# Patient Record
Sex: MALE | Race: OTHER | HISPANIC OR LATINO
[De-identification: names, ages, dates, MRNs, and addresses within clinical notes are randomized per-mention and may not be internally consistent; named-entity substitution may affect disease eponyms.]

---

## 2017-01-27 ENCOUNTER — MEDICATION RENEWAL (OUTPATIENT)
Age: 57
End: 2017-01-27

## 2017-02-27 ENCOUNTER — RX RENEWAL (OUTPATIENT)
Age: 57
End: 2017-02-27

## 2017-03-22 ENCOUNTER — RX RENEWAL (OUTPATIENT)
Age: 57
End: 2017-03-22

## 2017-04-22 ENCOUNTER — APPOINTMENT (OUTPATIENT)
Dept: FAMILY MEDICINE | Facility: CLINIC | Age: 57
End: 2017-04-22

## 2017-04-22 VITALS
DIASTOLIC BLOOD PRESSURE: 78 MMHG | SYSTOLIC BLOOD PRESSURE: 136 MMHG | WEIGHT: 198 LBS | BODY MASS INDEX: 28.35 KG/M2 | HEIGHT: 70 IN

## 2017-04-22 VITALS
HEIGHT: 70 IN | SYSTOLIC BLOOD PRESSURE: 134 MMHG | DIASTOLIC BLOOD PRESSURE: 76 MMHG | WEIGHT: 198 LBS | BODY MASS INDEX: 28.35 KG/M2

## 2017-05-12 LAB
ALBUMIN SERPL ELPH-MCNC: 4.5 G/DL
ALP BLD-CCNC: 94 U/L
ALT SERPL-CCNC: 34 U/L
ANION GAP SERPL CALC-SCNC: 18 MMOL/L
AST SERPL-CCNC: 27 U/L
BASOPHILS # BLD AUTO: 0.05 K/UL
BASOPHILS NFR BLD AUTO: 1 %
BILIRUB SERPL-MCNC: 0.2 MG/DL
BUN SERPL-MCNC: 16 MG/DL
CALCIUM SERPL-MCNC: 9.4 MG/DL
CHLORIDE SERPL-SCNC: 103 MMOL/L
CHOLEST SERPL-MCNC: 172 MG/DL
CHOLEST/HDLC SERPL: 4 RATIO
CO2 SERPL-SCNC: 21 MMOL/L
CREAT SERPL-MCNC: 1.29 MG/DL
EOSINOPHIL # BLD AUTO: 0.11 K/UL
EOSINOPHIL NFR BLD AUTO: 2.1 %
GLUCOSE SERPL-MCNC: 98 MG/DL
HCT VFR BLD CALC: 43.8 %
HDLC SERPL-MCNC: 43 MG/DL
HGB BLD-MCNC: 14.2 G/DL
IMM GRANULOCYTES NFR BLD AUTO: 0 %
LDLC SERPL CALC-MCNC: 75 MG/DL
LYMPHOCYTES # BLD AUTO: 2.27 K/UL
LYMPHOCYTES NFR BLD AUTO: 43.6 %
MAN DIFF?: NORMAL
MCHC RBC-ENTMCNC: 29.6 PG
MCHC RBC-ENTMCNC: 32.4 GM/DL
MCV RBC AUTO: 91.4 FL
MONOCYTES # BLD AUTO: 0.43 K/UL
MONOCYTES NFR BLD AUTO: 8.3 %
NEUTROPHILS # BLD AUTO: 2.35 K/UL
NEUTROPHILS NFR BLD AUTO: 45 %
PLATELET # BLD AUTO: 234 K/UL
POTASSIUM SERPL-SCNC: 4.2 MMOL/L
PROT SERPL-MCNC: 7.7 G/DL
PSA FREE FLD-MCNC: 15.7 %
PSA FREE SERPL-MCNC: 0.18 NG/ML
PSA SERPL-MCNC: 1.15 NG/ML
RBC # BLD: 4.79 M/UL
RBC # FLD: 13.3 %
SODIUM SERPL-SCNC: 142 MMOL/L
TRIGL SERPL-MCNC: 272 MG/DL
WBC # FLD AUTO: 5.21 K/UL

## 2017-11-01 ENCOUNTER — RX RENEWAL (OUTPATIENT)
Age: 57
End: 2017-11-01

## 2017-11-01 ENCOUNTER — MEDICATION RENEWAL (OUTPATIENT)
Age: 57
End: 2017-11-01

## 2017-11-13 ENCOUNTER — MEDICATION RENEWAL (OUTPATIENT)
Age: 57
End: 2017-11-13

## 2017-12-27 ENCOUNTER — APPOINTMENT (OUTPATIENT)
Dept: FAMILY MEDICINE | Facility: CLINIC | Age: 57
End: 2017-12-27
Payer: COMMERCIAL

## 2017-12-27 ENCOUNTER — NON-APPOINTMENT (OUTPATIENT)
Age: 57
End: 2017-12-27

## 2017-12-27 VITALS
SYSTOLIC BLOOD PRESSURE: 124 MMHG | OXYGEN SATURATION: 98 % | DIASTOLIC BLOOD PRESSURE: 70 MMHG | BODY MASS INDEX: 28.06 KG/M2 | HEIGHT: 70 IN | WEIGHT: 196 LBS | HEART RATE: 58 BPM | RESPIRATION RATE: 14 BRPM

## 2017-12-27 DIAGNOSIS — Z83.3 FAMILY HISTORY OF DIABETES MELLITUS: ICD-10-CM

## 2017-12-27 DIAGNOSIS — Z87.828 PERSONAL HISTORY OF OTHER (HEALED) PHYSICAL INJURY AND TRAUMA: ICD-10-CM

## 2017-12-27 DIAGNOSIS — Z87.898 PERSONAL HISTORY OF OTHER SPECIFIED CONDITIONS: ICD-10-CM

## 2017-12-27 DIAGNOSIS — Z87.2 PERSONAL HISTORY OF DISEASES OF THE SKIN AND SUBCUTANEOUS TISSUE: ICD-10-CM

## 2017-12-27 LAB
BILIRUB UR QL STRIP: NORMAL
CLARITY UR: CLEAR
GLUCOSE UR-MCNC: NORMAL
HCG UR QL: 0.2 EU/DL
HGB UR QL STRIP.AUTO: NORMAL
KETONES UR-MCNC: NORMAL
LEUKOCYTE ESTERASE UR QL STRIP: NORMAL
NITRITE UR QL STRIP: NORMAL
PH UR STRIP: 5.5
PROT UR STRIP-MCNC: NORMAL
SP GR UR STRIP: 1.02

## 2017-12-27 PROCEDURE — 99213 OFFICE O/P EST LOW 20 MIN: CPT | Mod: 25

## 2017-12-27 PROCEDURE — 99396 PREV VISIT EST AGE 40-64: CPT | Mod: 25

## 2017-12-27 PROCEDURE — 81003 URINALYSIS AUTO W/O SCOPE: CPT | Mod: QW

## 2017-12-27 PROCEDURE — 93000 ELECTROCARDIOGRAM COMPLETE: CPT

## 2018-02-04 ENCOUNTER — FORM ENCOUNTER (OUTPATIENT)
Age: 58
End: 2018-02-04

## 2018-02-05 ENCOUNTER — OUTPATIENT (OUTPATIENT)
Dept: OUTPATIENT SERVICES | Facility: HOSPITAL | Age: 58
LOS: 1 days | End: 2018-02-05
Payer: COMMERCIAL

## 2018-02-05 ENCOUNTER — APPOINTMENT (OUTPATIENT)
Dept: ULTRASOUND IMAGING | Facility: CLINIC | Age: 58
End: 2018-02-05
Payer: COMMERCIAL

## 2018-02-05 DIAGNOSIS — N43.3 HYDROCELE, UNSPECIFIED: ICD-10-CM

## 2018-02-05 PROCEDURE — 93975 VASCULAR STUDY: CPT

## 2018-02-05 PROCEDURE — 93975 VASCULAR STUDY: CPT | Mod: 26

## 2018-02-21 ENCOUNTER — APPOINTMENT (OUTPATIENT)
Dept: CARDIOLOGY | Facility: CLINIC | Age: 58
End: 2018-02-21
Payer: COMMERCIAL

## 2018-02-21 ENCOUNTER — NON-APPOINTMENT (OUTPATIENT)
Age: 58
End: 2018-02-21

## 2018-02-21 VITALS
HEART RATE: 58 BPM | OXYGEN SATURATION: 100 % | WEIGHT: 200 LBS | SYSTOLIC BLOOD PRESSURE: 144 MMHG | BODY MASS INDEX: 28.7 KG/M2 | DIASTOLIC BLOOD PRESSURE: 78 MMHG

## 2018-02-21 DIAGNOSIS — I45.10 UNSPECIFIED RIGHT BUNDLE-BRANCH BLOCK: ICD-10-CM

## 2018-02-21 PROCEDURE — 99204 OFFICE O/P NEW MOD 45 MIN: CPT

## 2018-02-21 PROCEDURE — 93000 ELECTROCARDIOGRAM COMPLETE: CPT

## 2018-02-27 ENCOUNTER — APPOINTMENT (OUTPATIENT)
Dept: CARDIOLOGY | Facility: CLINIC | Age: 58
End: 2018-02-27
Payer: COMMERCIAL

## 2018-02-27 PROCEDURE — 93306 TTE W/DOPPLER COMPLETE: CPT

## 2018-03-12 ENCOUNTER — APPOINTMENT (OUTPATIENT)
Dept: FAMILY MEDICINE | Facility: CLINIC | Age: 58
End: 2018-03-12
Payer: COMMERCIAL

## 2018-03-12 VITALS
BODY MASS INDEX: 28.35 KG/M2 | OXYGEN SATURATION: 99 % | HEIGHT: 70 IN | DIASTOLIC BLOOD PRESSURE: 76 MMHG | WEIGHT: 198 LBS | RESPIRATION RATE: 14 BRPM | SYSTOLIC BLOOD PRESSURE: 136 MMHG | HEART RATE: 68 BPM

## 2018-03-12 DIAGNOSIS — S70.00XA CONTUSION OF UNSPECIFIED HIP, INITIAL ENCOUNTER: ICD-10-CM

## 2018-03-12 DIAGNOSIS — M25.569 PAIN IN UNSPECIFIED KNEE: ICD-10-CM

## 2018-03-12 DIAGNOSIS — S83.249A OTHER TEAR OF MEDIAL MENISCUS, CURRENT INJURY, UNSPECIFIED KNEE, INITIAL ENCOUNTER: ICD-10-CM

## 2018-03-12 PROCEDURE — 99214 OFFICE O/P EST MOD 30 MIN: CPT

## 2018-03-22 ENCOUNTER — MEDICATION RENEWAL (OUTPATIENT)
Age: 58
End: 2018-03-22

## 2018-04-18 ENCOUNTER — APPOINTMENT (OUTPATIENT)
Dept: CARDIOLOGY | Facility: CLINIC | Age: 58
End: 2018-04-18

## 2018-04-30 ENCOUNTER — APPOINTMENT (OUTPATIENT)
Dept: CARDIOLOGY | Facility: CLINIC | Age: 58
End: 2018-04-30

## 2018-06-25 ENCOUNTER — APPOINTMENT (OUTPATIENT)
Dept: CARDIOLOGY | Facility: CLINIC | Age: 58
End: 2018-06-25
Payer: COMMERCIAL

## 2018-06-25 PROCEDURE — 93015 CV STRESS TEST SUPVJ I&R: CPT

## 2018-06-27 ENCOUNTER — OTHER (OUTPATIENT)
Age: 58
End: 2018-06-27

## 2018-10-05 ENCOUNTER — APPOINTMENT (OUTPATIENT)
Dept: FAMILY MEDICINE | Facility: CLINIC | Age: 58
End: 2018-10-05

## 2018-10-16 ENCOUNTER — RX RENEWAL (OUTPATIENT)
Age: 58
End: 2018-10-16

## 2018-11-24 ENCOUNTER — APPOINTMENT (OUTPATIENT)
Dept: FAMILY MEDICINE | Facility: CLINIC | Age: 58
End: 2018-11-24
Payer: COMMERCIAL

## 2018-11-24 VITALS
BODY MASS INDEX: 28.2 KG/M2 | WEIGHT: 197 LBS | OXYGEN SATURATION: 98 % | RESPIRATION RATE: 14 BRPM | HEIGHT: 70 IN | HEART RATE: 68 BPM | DIASTOLIC BLOOD PRESSURE: 80 MMHG | SYSTOLIC BLOOD PRESSURE: 130 MMHG

## 2018-11-24 PROCEDURE — 36415 COLL VENOUS BLD VENIPUNCTURE: CPT

## 2018-11-24 PROCEDURE — 99214 OFFICE O/P EST MOD 30 MIN: CPT | Mod: 25

## 2018-11-24 NOTE — HEALTH RISK ASSESSMENT
[No falls in past year] : Patient reported no falls in the past year [0] : 2) Feeling down, depressed, or hopeless: Not at all (0) [] : No [de-identified] : card [EYD1Hcolc] : 0

## 2018-11-24 NOTE — HISTORY OF PRESENT ILLNESS
[FreeTextEntry1] : Pt is here for BP check , RX refills.  Pt also c/o joint pain, bilat knees, ankles , hands.  Pt states is worse in AM when waking up, and gets better throughout the day.  Pt says all pain is always bi-lat, pt takes an NSAID at times which helps.   [de-identified] : pt notes no cp, no sob, saw cardio, has some aches in hands  , feels stiff, pt notes needs refills and needs repeat blood as none since 2017

## 2018-11-24 NOTE — DATA REVIEWED
[FreeTextEntry1] : echo 2/18 nl la, nl ef\par stress good et , 14 mets, bop increased w exertiopn\par us 2/18 - hydrocoel

## 2018-11-24 NOTE — REVIEW OF SYSTEMS
[Fever] : no fever [Earache] : no earache [Chest Pain] : no chest pain [Shortness Of Breath] : no shortness of breath [Joint Pain] : joint pain [FreeTextEntry9] : some pain in prox thumb , us in am

## 2018-12-24 LAB
ALBUMIN SERPL ELPH-MCNC: 4.3 G/DL
ALP BLD-CCNC: 88 U/L
ALT SERPL-CCNC: 38 U/L
ANION GAP SERPL CALC-SCNC: 14 MMOL/L
AST SERPL-CCNC: 28 U/L
BASOPHILS # BLD AUTO: 0.05 K/UL
BASOPHILS NFR BLD AUTO: 1.2 %
BILIRUB SERPL-MCNC: 0.3 MG/DL
BUN SERPL-MCNC: 16 MG/DL
CALCIUM SERPL-MCNC: 9.3 MG/DL
CHLORIDE SERPL-SCNC: 108 MMOL/L
CHOLEST SERPL-MCNC: 178 MG/DL
CHOLEST/HDLC SERPL: 4.2 RATIO
CO2 SERPL-SCNC: 22 MMOL/L
CREAT SERPL-MCNC: 1.31 MG/DL
EOSINOPHIL # BLD AUTO: 0.1 K/UL
EOSINOPHIL NFR BLD AUTO: 2.4 %
ERYTHROCYTE [SEDIMENTATION RATE] IN BLOOD BY WESTERGREN METHOD: 20 MM/HR
GLUCOSE SERPL-MCNC: 92 MG/DL
HCT VFR BLD CALC: 47.2 %
HDLC SERPL-MCNC: 42 MG/DL
HGB BLD-MCNC: 15.5 G/DL
IMM GRANULOCYTES NFR BLD AUTO: 0 %
LDLC SERPL CALC-MCNC: 110 MG/DL
LYMPHOCYTES # BLD AUTO: 1.67 K/UL
LYMPHOCYTES NFR BLD AUTO: 40.7 %
MAN DIFF?: NORMAL
MCHC RBC-ENTMCNC: 30.6 PG
MCHC RBC-ENTMCNC: 32.8 GM/DL
MCV RBC AUTO: 93.3 FL
MONOCYTES # BLD AUTO: 0.37 K/UL
MONOCYTES NFR BLD AUTO: 9 %
NEUTROPHILS # BLD AUTO: 1.91 K/UL
NEUTROPHILS NFR BLD AUTO: 46.7 %
PLATELET # BLD AUTO: 230 K/UL
POTASSIUM SERPL-SCNC: 4.8 MMOL/L
PROT SERPL-MCNC: 7.5 G/DL
PSA FREE FLD-MCNC: 23.1
PSA FREE SERPL-MCNC: 0.18 NG/ML
PSA SERPL-MCNC: 0.78 NG/ML
RBC # BLD: 5.06 M/UL
RBC # FLD: 13.4 %
RHEUMATOID FACT SER QL: <10 IU/ML
SODIUM SERPL-SCNC: 144 MMOL/L
TRIGL SERPL-MCNC: 128 MG/DL
TSH SERPL-ACNC: 2.19 UIU/ML
URATE SERPL-MCNC: 8.9 MG/DL
WBC # FLD AUTO: 4.1 K/UL

## 2019-04-24 ENCOUNTER — APPOINTMENT (OUTPATIENT)
Dept: FAMILY MEDICINE | Facility: CLINIC | Age: 59
End: 2019-04-24

## 2019-04-24 DIAGNOSIS — M79.646 PAIN IN UNSPECIFIED FINGER(S): ICD-10-CM

## 2019-07-10 ENCOUNTER — NON-APPOINTMENT (OUTPATIENT)
Age: 59
End: 2019-07-10

## 2019-07-10 ENCOUNTER — APPOINTMENT (OUTPATIENT)
Dept: FAMILY MEDICINE | Facility: CLINIC | Age: 59
End: 2019-07-10
Payer: COMMERCIAL

## 2019-07-10 VITALS
HEART RATE: 54 BPM | OXYGEN SATURATION: 98 % | BODY MASS INDEX: 28.49 KG/M2 | RESPIRATION RATE: 14 BRPM | HEIGHT: 70 IN | WEIGHT: 199 LBS | DIASTOLIC BLOOD PRESSURE: 72 MMHG | SYSTOLIC BLOOD PRESSURE: 130 MMHG

## 2019-07-10 DIAGNOSIS — N43.3 HYDROCELE, UNSPECIFIED: ICD-10-CM

## 2019-07-10 LAB
BILIRUB UR QL STRIP: NORMAL
CLARITY UR: CLEAR
GLUCOSE UR-MCNC: NORMAL
HCG UR QL: 0.2 EU/DL
HGB UR QL STRIP.AUTO: NORMAL
KETONES UR-MCNC: NORMAL
LEUKOCYTE ESTERASE UR QL STRIP: NORMAL
NITRITE UR QL STRIP: NORMAL
PH UR STRIP: 6
PROT UR STRIP-MCNC: NORMAL
SP GR UR STRIP: 1.02

## 2019-07-10 PROCEDURE — 93000 ELECTROCARDIOGRAM COMPLETE: CPT

## 2019-07-10 PROCEDURE — 81003 URINALYSIS AUTO W/O SCOPE: CPT | Mod: QW

## 2019-07-10 PROCEDURE — 99396 PREV VISIT EST AGE 40-64: CPT | Mod: 25

## 2019-07-10 NOTE — REVIEW OF SYSTEMS
[Abdominal Pain] : abdominal pain [Heartburn] : heartburn [Chest Pain] : no chest pain [Shortness Of Breath] : no shortness of breath [FreeTextEntry7] : pt takes antacid, takes daily

## 2019-07-10 NOTE — HEALTH RISK ASSESSMENT
[No] : No [No falls in past year] : Patient reported no falls in the past year [With Family] : lives with family [None] : None [Employed] : employed [Feels Safe at Home] : Feels safe at home [] :  [Fully functional (using the telephone, shopping, preparing meals, housekeeping, doing laundry, using] : Fully functional and needs no help or supervision to perform IADLs (using the telephone, shopping, preparing meals, housekeeping, doing laundry, using transportation, managing medications and managing finances) [Fully functional (bathing, dressing, toileting, transferring, walking, feeding)] : Fully functional (bathing, dressing, toileting, transferring, walking, feeding) [Reports normal functional visual acuity (ie: able to read med bottle)] : Reports normal functional visual acuity [Very Good] : ~his/her~  mood as very good [With Patient/Caregiver] : With Patient/Caregiver [Name: ___] : Health Care Proxy's Name: [unfilled]  [Relationship: ___] : Relationship: [unfilled] [FreeTextEntry1] : arpit r thumb bothers him at joint [] : No [Change in mental status noted] : No change in mental status noted [Reports changes in hearing] : Reports no changes in hearing [Reports changes in dental health] : Reports no changes in dental health [Reports changes in vision] : Reports no changes in vision [ColonoscopyDate] : 01/14 [FreeTextEntry2] :  [de-identified] : reading glasses

## 2019-07-10 NOTE — HISTORY OF PRESENT ILLNESS
[FreeTextEntry1] : pt here for cpe [de-identified] : pt notes some pain in l thumb proximal phalanx  , pt notes gout has been good over last couple years, pt notes no cp, no sob,

## 2019-12-03 ENCOUNTER — RX RENEWAL (OUTPATIENT)
Age: 59
End: 2019-12-03

## 2019-12-19 ENCOUNTER — RX RENEWAL (OUTPATIENT)
Age: 59
End: 2019-12-19

## 2020-01-24 ENCOUNTER — APPOINTMENT (OUTPATIENT)
Dept: FAMILY MEDICINE | Facility: CLINIC | Age: 60
End: 2020-01-24
Payer: COMMERCIAL

## 2020-01-24 VITALS
DIASTOLIC BLOOD PRESSURE: 74 MMHG | WEIGHT: 203 LBS | BODY MASS INDEX: 29.06 KG/M2 | HEART RATE: 61 BPM | TEMPERATURE: 98.3 F | HEIGHT: 70 IN | SYSTOLIC BLOOD PRESSURE: 118 MMHG | OXYGEN SATURATION: 98 %

## 2020-01-24 DIAGNOSIS — E79.0 HYPERURICEMIA W/OUT SIGNS OF INFLAMMATORY ARTHRITIS AND TOPHACEOUS DISEASE: ICD-10-CM

## 2020-01-24 PROCEDURE — 99214 OFFICE O/P EST MOD 30 MIN: CPT

## 2020-01-24 NOTE — HISTORY OF PRESENT ILLNESS
[FreeTextEntry1] : Patient is here for a 6 month follow up. pt notes pain in r thumb is better, pt notes ever since he started allopurinol , his thumb has been better. pt bp is better and chol is doing well  [de-identified] : pt reports r thumb was gettign red , hot and swollen, is much better on alllopruinol, \par pt notes bl eye irritaiton in ams, is itchy, notes no trauma

## 2020-02-26 ENCOUNTER — RX RENEWAL (OUTPATIENT)
Age: 60
End: 2020-02-26

## 2020-03-19 ENCOUNTER — RX RENEWAL (OUTPATIENT)
Age: 60
End: 2020-03-19

## 2020-05-21 ENCOUNTER — RX RENEWAL (OUTPATIENT)
Age: 60
End: 2020-05-21

## 2020-05-22 ENCOUNTER — TRANSCRIPTION ENCOUNTER (OUTPATIENT)
Age: 60
End: 2020-05-22

## 2020-05-22 ENCOUNTER — APPOINTMENT (OUTPATIENT)
Dept: FAMILY MEDICINE | Facility: CLINIC | Age: 60
End: 2020-05-22
Payer: COMMERCIAL

## 2020-05-22 PROCEDURE — 99214 OFFICE O/P EST MOD 30 MIN: CPT | Mod: 95

## 2020-05-22 NOTE — DATA REVIEWED
[FreeTextEntry1] : covid ab 7.5 \par reviewed task where i asked staff to call pt fu 4/24 walkin  visit\par

## 2020-05-22 NOTE — HISTORY OF PRESENT ILLNESS
[Home] : at home, [unfilled] , at the time of the visit. [Verbal consent obtained from patient] : the patient, [unfilled] [Medical Office: ([facility])___] : at the medical office located in  [FreeTextEntry1] : pt notes had covid, pt notes is feeling better, pt notes he is about to return to work,  pt notes no fever now for few weeks , pt notes occ cough , but much better, no fever, pt did admit that when he had cough he was sob [de-identified] : pt here for eval of likely covid pneumonia , dx 4/24/20 , had nasal swab was neg, pt did not have cxr, was given abx and did abtest 5/8/20, pt notes over last few days has a cough, but better today

## 2020-05-22 NOTE — REVIEW OF SYSTEMS
[Cough] : cough [Earache] : no earache [Fever] : no fever [Chest Pain] : no chest pain [Shortness Of Breath] : no shortness of breath [Wheezing] : no wheezing

## 2020-05-22 NOTE — PHYSICAL EXAM
[No Acute Distress] : no acute distress [Supple] : supple [No Focal Deficits] : no focal deficits [Normal Gait] : normal gait [Normal Insight/Judgement] : insight and judgment were intact

## 2020-06-15 ENCOUNTER — RX RENEWAL (OUTPATIENT)
Age: 60
End: 2020-06-15

## 2020-06-29 ENCOUNTER — RX RENEWAL (OUTPATIENT)
Age: 60
End: 2020-06-29

## 2020-08-13 ENCOUNTER — RX RENEWAL (OUTPATIENT)
Age: 60
End: 2020-08-13

## 2020-08-24 ENCOUNTER — RX RENEWAL (OUTPATIENT)
Age: 60
End: 2020-08-24

## 2020-09-08 ENCOUNTER — RX RENEWAL (OUTPATIENT)
Age: 60
End: 2020-09-08

## 2021-01-04 ENCOUNTER — TRANSCRIPTION ENCOUNTER (OUTPATIENT)
Age: 61
End: 2021-01-04

## 2021-01-09 ENCOUNTER — TRANSCRIPTION ENCOUNTER (OUTPATIENT)
Age: 61
End: 2021-01-09

## 2021-02-02 ENCOUNTER — APPOINTMENT (OUTPATIENT)
Dept: VASCULAR SURGERY | Facility: CLINIC | Age: 61
End: 2021-02-02

## 2021-02-11 ENCOUNTER — APPOINTMENT (OUTPATIENT)
Dept: VASCULAR SURGERY | Facility: CLINIC | Age: 61
End: 2021-02-11
Payer: COMMERCIAL

## 2021-02-11 VITALS
BODY MASS INDEX: 29.2 KG/M2 | SYSTOLIC BLOOD PRESSURE: 158 MMHG | DIASTOLIC BLOOD PRESSURE: 83 MMHG | OXYGEN SATURATION: 97 % | HEART RATE: 62 BPM | WEIGHT: 204 LBS | HEIGHT: 70 IN | TEMPERATURE: 98.7 F

## 2021-02-11 DIAGNOSIS — I87.2 VENOUS INSUFFICIENCY (CHRONIC) (PERIPHERAL): ICD-10-CM

## 2021-02-11 PROCEDURE — 93970 EXTREMITY STUDY: CPT

## 2021-02-11 PROCEDURE — 99072 ADDL SUPL MATRL&STAF TM PHE: CPT

## 2021-02-11 PROCEDURE — 99203 OFFICE O/P NEW LOW 30 MIN: CPT

## 2021-03-03 ENCOUNTER — APPOINTMENT (OUTPATIENT)
Dept: ORTHOPEDIC SURGERY | Facility: CLINIC | Age: 61
End: 2021-03-03
Payer: COMMERCIAL

## 2021-03-03 VITALS
WEIGHT: 202 LBS | SYSTOLIC BLOOD PRESSURE: 173 MMHG | HEART RATE: 59 BPM | HEIGHT: 71 IN | DIASTOLIC BLOOD PRESSURE: 95 MMHG | BODY MASS INDEX: 28.28 KG/M2

## 2021-03-03 DIAGNOSIS — S83.412A SPRAIN OF MEDIAL COLLATERAL LIGAMENT OF LEFT KNEE, INITIAL ENCOUNTER: ICD-10-CM

## 2021-03-03 PROCEDURE — 99204 OFFICE O/P NEW MOD 45 MIN: CPT

## 2021-03-03 PROCEDURE — 73562 X-RAY EXAM OF KNEE 3: CPT | Mod: LT

## 2021-03-03 PROCEDURE — 99072 ADDL SUPL MATRL&STAF TM PHE: CPT

## 2021-03-03 NOTE — PHYSICAL EXAM
[LE] : Sensory: Intact in bilateral lower extremities [ALL] : dorsalis pedis, posterior tibial, femoral, popliteal, and radial 2+ and symmetric bilaterally [Normal] : Alert and in no acute distress [Poor Appearance] : well-appearing [de-identified] : GENERAL APPEARANCE: Well nourished and hydrated, pleasant, alert, and oriented x 3. Appears their stated age. \par HEENT: Normocephalic, extraocular eye motion intact. Nasal septum midline. Oral cavity clear. External auditory canal clear. \par RESPIRATORY: Breath sounds clear and audible in all lobes. No wheezing, No accessory muscle use.\par CARDIOVASCULAR: No apparent abnormalities. No lower leg edema. No varicosities. Pedal pulses are palpable.\par NEUROLOGIC: Sensation is normal, no muscle weakness in the upper or lower extremities.\par DERMATOLOGIC: No apparent skin lesions, moist, warm, no rash.\par SPINE: Cervical spine appears normal and moves freely; thoracic spine appears normal and moves freely; lumbosacral spine appears normal and moves freely, normal, nontender.\par MUSCULOSKELETAL: Hands, wrists, and elbows are normal and move freely, shoulders are normal and move freely.  [de-identified] : Left knee exam is medial joint line tenderness, ROM is 0-120 degree, mild effusion is present, positive Lochmans  [de-identified] : 3V xray of the left knee done in the office today and reviewed by Dr. Willam Deluna demonstrates mild medial and patellofemoral osteoarthritis. There is calcification of the medial and lateral meniscus. Additionally, there is evidence of jerilyn-stieda from prior MCL injury.

## 2021-03-03 NOTE — END OF VISIT
[FreeTextEntry3] : I, Byron Mattson, acted solely as a scribe for Dr. Willam Deluna on this date 03/03/2021.

## 2021-03-03 NOTE — DISCUSSION/SUMMARY
[de-identified] : 59 y/o M with mild medial and patellofemoral osteoarthritis of the left knee. Additionally, there is calcification of the medial and lateral meniscus as well as evidence of jerilyn-stieda from prior MCL injury. Pt reports buckling in the knee. We explained to the patient that he may have reinjured his ACL and developed a meniscus tear. As a result, he will get an MRI of the left knee to rule out an ACL tear and meniscus tear. F/u after the MRI.\par He may be a candidate for ACL reconstruction with allograft versus arthroscopy.\par \par ACL reconstruction allograft: With the diagnosis of a completely torn anterior cruciate ligament (ACL), the choices are non-operative versus operative. The non-operative success is approximately a 33% chance of the patient's returning to having a functional knee again. By that I mean back to their sport endeavors. Two-thirds of the patients for the most part and as a generalization will go on to have problems such as tears in the meniscus, destruction of articular cartilage, and eventually arthritis. All of these patients will be required to use a brace and must always participate in a conditioning (strengthening) program, if they plan to participate in any sports. The other option is operative intervention, which, today, yields success rates ranging from 85 to 90%. Success is once again defined as an ability to return to a functional sporting lifestyle with or without a brace. In this case, I would, if the patient chooses surgery recommend an anterior cruciate ligament reconstruction with bone patella tendon bone allograft. The allograft, which necessitated a 2-3 inch skin incision, is arthroscopically positioned through the tibial and femoral tunnels to isometrically recreate the normal tension of the anterior cruciate ligament. The graft, once situated, is then fixed with two screws that are compressive within the tunnels. Sometimes on the tibial side, we use a tibial post, where a suture is tied around a screw placed perpendicular to the shaft of the tibia. The choice of technique is dependent upon the length of the bone tendon bone preparation. In some cases it is necessary due to subsequent discomfort that the screw be removed a year or so after surgery. The usual course for an ACL reconstruction is hospitalization of 24 hours (range 0 to 48 hours) with immediate continuous passive motion and partial to full weight bearing crutch walking. Crutches are usually dispensed with by 2 to 3 weeks. Patients who have desk jobs are usually back at work within a week. Driving is not permitted until there is good control of this leg. It will usually be 3 to 6 weeks that the patient is restricted from driving. The motion stage of therapy takes about 3 weeks while the strength stage averages 6 months from surgery for patients to return to sports. I let the patient know that some people have done it as early as 3 months, which I think is somewhat unrealistic. I have seen people not work hard and become a disaster, taking as long as 2 years. While the success rate (return to a functional sports lifestyle) is 85 to 90% potential, there are adverse complications. Complications include infection (approximately 1%) peroneal nerve palsies which would mean loss of foot function (<1%), loss of screw fixation at either the tibial or femoral tunnels (<1%), fracture of the patella (<1%), rupture of the remaining patella tendon (<1%), and fracture of the tibia where the bone graft was taken from (<1%). Other bizarre complications include reflex sympathetic dystrophy, which means that the patient has pain, which is totally out of proportion with the findings. This requires multiple manipulation procedures in physical therapy and can become an over bearing part of the patient's life for well over several years. Many of the manipulations might have to be performed with associated arthroscopy. Infrapatellar contracture syndromes are also another rare complication where the patient has limited extension and flexion due to scarring. This is also a very difficult experience for the patient and will often require multiple surgical procedures, arthroscopy, manipulation, and sometimes arthrotomy. I have informed the patient that the meniscus and/or articular cartilage can be injured concurrently with the anterior cruciate ligament. With the meniscus tears the basic idea is to repair (if possible) or remove as little of the tear as necessary. When either of these procedures is performed the postoperative routine are potential complications parallel those of the ACL reconstruction. With articular cartilage damage, the surface can be shaved, and in certain situations a portion placed back in situ. Sometimes the only treatment is drilling or eburnating the bone for the theoretical advantage of stimulating fibrocartilage growth (50/50 chance). When either of these procedures is performed the postoperative routine are potential complications parallel those of the ACL reconstruction. The allograft is basically a cadaver transplant of a bone patella tendon bone preparation. Other than the lack of harvesting the patient's tissue, there are basically no differences in the surgical technique of an autograft. However, there are two major sources of concern using an allograft. One, there is a theoretical albeit extremely rare risk of AIDS. We have heard figures suggesting a statistical risk from 1 in 2 million to 1 in 8 million. Obviously these are low risk of a fatal disease exist. For any one patient, there is really 0 or 100% risk of davide the HIV virus. The second issue, is the ability of the patient (host) to incorporate the allograft. At the present time there does not seem to be any long tern adverse effects that negate the early effectiveness of allograft reconstruction for a deficient anterior cruciate ligament. The biology does, however, seem to mean that true incorporation is microscopically longer than an autograft procedure. Further studies are unquestionably forthcoming and at this point this is the best information that I can give this patient. I have informed this patient that any one of these complications can basically be a life long disaster and can make them much worse off then should they have chosen the non-operative treatment. I think the patient understands the potential complications. I think the patient also understands the percentage of successes and failures. The choice is now theirs.

## 2021-03-03 NOTE — REVIEW OF SYSTEMS
[Joint Pain] : joint pain [Joint Swelling] : joint swelling [Negative] : Heme/Lymph [FreeTextEntry9] : left knee

## 2021-03-03 NOTE — HISTORY OF PRESENT ILLNESS
[Pain Location] : pain [___ mths] : [unfilled] month(s) ago [4] : a current pain level of 4/10 [2] : a minimum pain level of 2/10 [7] : a maximum pain level of 7/10 [Standing] : standing [Intermit.] : ~He/She~ states the symptoms seem to be intermittent [Walking] : worsened by walking [Ice] : relieved by ice [Rest] : relieved by rest [NSAIDs] : relieved by nonsteroidal anti-inflammatory drugs [de-identified] : 61 y/o M presents with left knee pain which began on 2/3/2021 while walking. He previously consulted Dr. Campbell in February 2021, vascular MD, for swelling and was told to see ortho regarding this pain. He is having intermittent pain which he describes as achy and throbbing. He explains that rest and ice alleviates his pain. On the other hand, standing and walking exacerbates his pain. He notes something is moving in the medial side of the knee. Pt reports buckling. He takes Aleve which helps. He wears a brace to work. Pt notes that he works 12 hours on his feet. Pt reports prior ACL injury. He has Mhx of gout on allopurinol.  [de-identified] : standing

## 2021-04-14 ENCOUNTER — APPOINTMENT (OUTPATIENT)
Dept: ORTHOPEDIC SURGERY | Facility: CLINIC | Age: 61
End: 2021-04-14
Payer: COMMERCIAL

## 2021-04-14 ENCOUNTER — NON-APPOINTMENT (OUTPATIENT)
Age: 61
End: 2021-04-14

## 2021-04-14 VITALS
HEIGHT: 71 IN | WEIGHT: 202 LBS | BODY MASS INDEX: 28.28 KG/M2 | DIASTOLIC BLOOD PRESSURE: 77 MMHG | SYSTOLIC BLOOD PRESSURE: 153 MMHG | HEART RATE: 71 BPM

## 2021-04-14 DIAGNOSIS — S83.512S SPRAIN OF ANTERIOR CRUCIATE LIGAMENT OF LEFT KNEE, SEQUELA: ICD-10-CM

## 2021-04-14 PROCEDURE — 99214 OFFICE O/P EST MOD 30 MIN: CPT

## 2021-04-14 PROCEDURE — 99072 ADDL SUPL MATRL&STAF TM PHE: CPT

## 2021-04-14 NOTE — DISCUSSION/SUMMARY
[de-identified] : 59 y/o M with medial and lateral meniscus tears and an ACL tear in the left knee. There are two surgeries we discussed with the pt. One option would be an arthroscopy. The second option would be an arthroscopy and an ACL reconstruction. We encourage the pt to just have the arthroscopy. Since he has been living with the ACL tear for a long period of time and has been able to function, we don't think he should pursue ACL reconstruction. The patient understands and agrees with this course of action. He scheduled the left knee arthroscopy today. \par \par The percentages of success in an arthroscopy that involves a torn meniscus and arthritic changes is dependent upon how bad the arthritic changes are. Basically, removing a meniscal tear allows us to ascertain how bad the patient's articular cartilage destruction (arthritis) is. The arthroscopy cleans out any debris from the arthritic process as well as removing the meniscal tear. Approximately 75% of the patients will say that they feel relief, although their x-rays will continue to show significant arthritic changes. Arthroscopy for arthritis is a temporizing procedure, yielding subjective success (patient satisfaction) for less than two to five years. In some cases, the knee might eventually require a knee replacement for symptomatic relief. The prognostic factors that are somewhat favorable predictive values in arthroscopic debridements (removal of loose articular cartilage, loose body and inflamed synovium) of an arthritic knee are: short duration of symptoms, effusion (swelling), minimal deformity and good range of motion. The complications with any arthroscopy include the risk of anaesthetic complications and death, blood clots and pulmonary embolus, infection (less than 1%), nerve damage, by which we would mean a peroneal palsy (less than 0.1%) (small area of skin numbness is so common, we do not consider its presence a complication), injury to the popliteal artery, which is so rare that there are no statistics, but should it occur could theoretically lead to amputation, which is extremely unlikely. There is often a chance of getting a hemarthrosis (blood in the joint) but this usually resolves with local measures of icing, physical therapy, and aspiration. Reflex sympathetic dystrophy (RSD) is another extremely rare but theoretical complication. This (RSD) means that the patient has a stiff painful joint that is out of proportion to the objective pathology of the knee. Subsequently, it might require years of physical therapy before one regains a functional knee with RSD. Infrapatellar contracture syndrome (stiff joint) is sometimes reported and associated with RSD, but it usually is a result of not being aggressive in physical therapy. I think the patient understands the risk benefit ratio of arthroscopy and will think about whether they would prefer the nonoperative or surgical treatment option.

## 2021-04-14 NOTE — PHYSICAL EXAM
[LE] : Sensory: Intact in bilateral lower extremities [ALL] : dorsalis pedis, posterior tibial, femoral, popliteal, and radial 2+ and symmetric bilaterally [Normal] : Alert and in no acute distress [Poor Appearance] : well-appearing [de-identified] : GENERAL APPEARANCE: Well nourished and hydrated, pleasant, alert, and oriented x 3. Appears their stated age. \par HEENT: Normocephalic, extraocular eye motion intact. Nasal septum midline. Oral cavity clear. External auditory canal clear. \par RESPIRATORY: Breath sounds clear and audible in all lobes. No wheezing, No accessory muscle use.\par CARDIOVASCULAR: No apparent abnormalities. No lower leg edema. No varicosities. Pedal pulses are palpable.\par NEUROLOGIC: Sensation is normal, no muscle weakness in the upper or lower extremities.\par DERMATOLOGIC: No apparent skin lesions, moist, warm, no rash.\par SPINE: Cervical spine appears normal and moves freely; thoracic spine appears normal and moves freely; lumbosacral spine appears normal and moves freely, normal, nontender.\par MUSCULOSKELETAL: Hands, wrists, and elbows are normal and move freely, shoulders are normal and move freely.  [de-identified] : left knee exam is medial joint line tenderness, ROM is 0-120 degree, mild effusion is present, positive Lochmanns [de-identified] : the MRI of left knee on 3/17/21 showed complex multidirectional tear posterior horn and body of medial meniscus. with longitudinal vertical oblique tear through the posterior horn of the medial meniscus as well as longitudinal horizontal oblique and radial components with displaced meniscal tissue. \par Chronic ACL full thickness tear.

## 2021-04-14 NOTE — END OF VISIT
[FreeTextEntry3] : I, Byron Mattson, acted solely as a scribe for Dr. Willam Deluna on this date 04/14/2021.

## 2021-04-14 NOTE — HISTORY OF PRESENT ILLNESS
[Pain Location] : pain [Stable] : stable [3] : a current pain level of 3/10 [7] : a maximum pain level of 7/10 [de-identified] : 59 y/o M presents with left knee pain which began on 2/3/2021 while walking. He previously consulted Dr. Campbell in February 2021, vascular MD for swelling and was told to see ortho regarding this pain. He is having intermittent pain which he describes as achy and throbbing. He explains that rest and ice  alleviates his pain. On the other hand, standing and walking exacerbates his pain. he notes something is moving in the medial side of the knee. He experiences buckling and instability. he takes Aleve which helps. \par he has Mhx of gout on allopurinol. He has a known history of an ACL tear.

## 2021-04-27 ENCOUNTER — OUTPATIENT (OUTPATIENT)
Dept: OUTPATIENT SERVICES | Facility: HOSPITAL | Age: 61
LOS: 1 days | End: 2021-04-27
Payer: COMMERCIAL

## 2021-04-27 VITALS
TEMPERATURE: 98 F | HEIGHT: 61 IN | RESPIRATION RATE: 16 BRPM | DIASTOLIC BLOOD PRESSURE: 60 MMHG | SYSTOLIC BLOOD PRESSURE: 120 MMHG | HEART RATE: 65 BPM | WEIGHT: 197.31 LBS

## 2021-04-27 DIAGNOSIS — E78.00 PURE HYPERCHOLESTEROLEMIA, UNSPECIFIED: ICD-10-CM

## 2021-04-27 DIAGNOSIS — Z01.818 ENCOUNTER FOR OTHER PREPROCEDURAL EXAMINATION: ICD-10-CM

## 2021-04-27 DIAGNOSIS — Z29.9 ENCOUNTER FOR PROPHYLACTIC MEASURES, UNSPECIFIED: ICD-10-CM

## 2021-04-27 DIAGNOSIS — Z98.890 OTHER SPECIFIED POSTPROCEDURAL STATES: Chronic | ICD-10-CM

## 2021-04-27 DIAGNOSIS — S83.282A OTHER TEAR OF LATERAL MENISCUS, CURRENT INJURY, LEFT KNEE, INITIAL ENCOUNTER: ICD-10-CM

## 2021-04-27 DIAGNOSIS — I10 ESSENTIAL (PRIMARY) HYPERTENSION: ICD-10-CM

## 2021-04-27 DIAGNOSIS — S83.242A OTHER TEAR OF MEDIAL MENISCUS, CURRENT INJURY, LEFT KNEE, INITIAL ENCOUNTER: ICD-10-CM

## 2021-04-27 LAB
A1C WITH ESTIMATED AVERAGE GLUCOSE RESULT: 5.9 % — HIGH (ref 4–5.6)
ALBUMIN SERPL ELPH-MCNC: 4.1 G/DL — SIGNIFICANT CHANGE UP (ref 3.3–5.2)
ALP SERPL-CCNC: 110 U/L — SIGNIFICANT CHANGE UP (ref 40–120)
ALT FLD-CCNC: 25 U/L — SIGNIFICANT CHANGE UP
ANION GAP SERPL CALC-SCNC: 12 MMOL/L — SIGNIFICANT CHANGE UP (ref 5–17)
APTT BLD: 27.9 SEC — SIGNIFICANT CHANGE UP (ref 27.5–35.5)
AST SERPL-CCNC: 27 U/L — SIGNIFICANT CHANGE UP
BILIRUB SERPL-MCNC: <0.2 MG/DL — LOW (ref 0.4–2)
BLD GP AB SCN SERPL QL: SIGNIFICANT CHANGE UP
BUN SERPL-MCNC: 17 MG/DL — SIGNIFICANT CHANGE UP (ref 8–20)
CALCIUM SERPL-MCNC: 8.9 MG/DL — SIGNIFICANT CHANGE UP (ref 8.6–10.2)
CHLORIDE SERPL-SCNC: 108 MMOL/L — HIGH (ref 98–107)
CO2 SERPL-SCNC: 23 MMOL/L — SIGNIFICANT CHANGE UP (ref 22–29)
CREAT SERPL-MCNC: 1.23 MG/DL — SIGNIFICANT CHANGE UP (ref 0.5–1.3)
ESTIMATED AVERAGE GLUCOSE: 123 MG/DL — HIGH (ref 68–114)
GLUCOSE SERPL-MCNC: 114 MG/DL — HIGH (ref 70–99)
HCT VFR BLD CALC: 40.4 % — SIGNIFICANT CHANGE UP (ref 39–50)
HGB BLD-MCNC: 12.9 G/DL — LOW (ref 13–17)
INR BLD: 1.01 RATIO — SIGNIFICANT CHANGE UP (ref 0.88–1.16)
MCHC RBC-ENTMCNC: 30.2 PG — SIGNIFICANT CHANGE UP (ref 27–34)
MCHC RBC-ENTMCNC: 31.9 GM/DL — LOW (ref 32–36)
MCV RBC AUTO: 94.6 FL — SIGNIFICANT CHANGE UP (ref 80–100)
PLATELET # BLD AUTO: 239 K/UL — SIGNIFICANT CHANGE UP (ref 150–400)
POTASSIUM SERPL-MCNC: 4.5 MMOL/L — SIGNIFICANT CHANGE UP (ref 3.5–5.3)
POTASSIUM SERPL-SCNC: 4.5 MMOL/L — SIGNIFICANT CHANGE UP (ref 3.5–5.3)
PROT SERPL-MCNC: 7.1 G/DL — SIGNIFICANT CHANGE UP (ref 6.6–8.7)
PROTHROM AB SERPL-ACNC: 11.7 SEC — SIGNIFICANT CHANGE UP (ref 10.6–13.6)
RBC # BLD: 4.27 M/UL — SIGNIFICANT CHANGE UP (ref 4.2–5.8)
RBC # FLD: 13.2 % — SIGNIFICANT CHANGE UP (ref 10.3–14.5)
SODIUM SERPL-SCNC: 143 MMOL/L — SIGNIFICANT CHANGE UP (ref 135–145)
WBC # BLD: 5.3 K/UL — SIGNIFICANT CHANGE UP (ref 3.8–10.5)
WBC # FLD AUTO: 5.3 K/UL — SIGNIFICANT CHANGE UP (ref 3.8–10.5)

## 2021-04-27 PROCEDURE — 93010 ELECTROCARDIOGRAM REPORT: CPT

## 2021-04-27 PROCEDURE — G0463: CPT

## 2021-04-27 PROCEDURE — 93005 ELECTROCARDIOGRAM TRACING: CPT

## 2021-04-27 NOTE — H&P PST ADULT - ASSESSMENT
CAPRINI SCORE [CLOT]    AGE RELATED RISK FACTORS                                                       MOBILITY RELATED FACTORS  x[ ] Age 41-60 years                                            (1 Point)                  [ ] Bed rest                                                        (1 Point)  [ ] Age: 61-74 years                                           (2 Points)                 [ ] Plaster cast                                                   (2 Points)  [ ] Age= 75 years                                              (3 Points)                 [ ] Bed bound for more than 72 hours                 (2 Points)    DISEASE RELATED RISK FACTORS                                               GENDER SPECIFIC FACTORS  [ ] Edema in the lower extremities                       (1 Point)                  [ ] Pregnancy                                                     (1 Point)  [ ] Varicose veins                                               (1 Point)                  [ ] Post-partum < 6 weeks                                   (1 Point)             [ x] BMI > 25 Kg/m2                                            (1 Point)                  [ ] Hormonal therapy  or oral contraception          (1 Point)                 [ ] Sepsis (in the previous month)                        (1 Point)                  [ ] History of pregnancy complications                 (1 point)  [ ] Pneumonia or serious lung disease                                               [ ] Unexplained or recurrent                     (1 Point)           (in the previous month)                               (1 Point)  [ ] Abnormal pulmonary function test                     (1 Point)                 SURGERY RELATED RISK FACTORS  [ ] Acute myocardial infarction                              (1 Point)                 [ ]  Section                                             (1 Point)  [ ] Congestive heart failure (in the previous month)  (1 Point)               [ ] Minor surgery                                                  (1 Point)   [ ] Inflammatory bowel disease                             (1 Point)                 [ x] Arthroscopic surgery                                        (2 Points)  [ ] Central venous access                                      (2 Points)                [ ] General surgery lasting more than 45 minutes   (2 Points)       [ ] Stroke (in the previous month)                          (5 Points)               [ ] Elective arthroplasty                                         (5 Points)                                                                                                                                               HEMATOLOGY RELATED FACTORS                                                 TRAUMA RELATED RISK FACTORS  [ ] Prior episodes of VTE                                     (3 Points)                [ ] Fracture of the hip, pelvis, or leg                       (5 Points)  [ ] Positive family history for VTE                         (3 Points)                 [ ] Acute spinal cord injury (in the previous month)  (5 Points)  [ ] Prothrombin 28164 A                                     (3 Points)                 [ ] Paralysis  (less than 1 month)                             (5 Points)  [ ] Factor V Leiden                                             (3 Points)                  [ ] Multiple Trauma within 1 month                        (5 Points)  [ ] Lupus anticoagulants                                     (3 Points)                                                           [ ] Anticardiolipin antibodies                               (3 Points)                                                       [ ] High homocysteine in the blood                      (3 Points)                                             [ ] Other congenital or acquired thrombophilia      (3 Points)                                                [ ] Heparin induced thrombocytopenia                  (3 Points)                                          Total Score [     4   ]    Caprini Score 0 - 2:  Low Risk, No VTE Prophylaxis required for most patients, encourage ambulation  Caprini Score 3 - 6:  At Risk, pharmacologic VTE prophylaxis is indicated for most patients (in the absence of a contraindication)  Caprini Score Greater than or = 7:  High Risk, pharmacologic VTE prophylaxis is indicated for most patients (in the absence of a contraindication)    OPIOID RISK TOOL    HAIM EACH BOX THAT APPLIES AND ADD TOTALS AT THE END    FAMILY HISTORY OF SUBSTANCE ABUSE                 FEMALE         MALE                                                Alcohol                             [  ]1 pt          [  ]3pts                                               Illegal Durgs                     [  ]2 pts        [  ]3pts                                               Rx Drugs                           [  ]4 pts        [  ]4 pts    PERSONAL HISTORY OF SUBSTANCE ABUSE                                                                                          Alcohol                             [  ]3 pts       [  ]3 pts                                               Illegal Drugs                     [  ]4 pts        [  ]4 pts                                               Rx Drugs                           [  ]5 pts        [  ]5 pts    AGE BETWEEN 16-45 YEARS                                      [  ]1 pt         [  ]1 pt    HISTORY OF PREADOLESCENT   SEXUAL ABUSE                                                             [  ]3 pts        [  ]0pts    PSYCHOLOGICAL DISEASE                     ADD, OCD, Bipolar, Schizophrenia        [  ]2 pts         [  ]2 pts                      Depression                                               [  ]1 pt           [  ]1 pt           SCORING TOTAL   (add numbers and type here)              (0)                                     A score of 3 or lower indicated LOW risk for future opioid abuse  A score of 4 to 7 indicated moderate risk for future opioid abuse  A score of 8 or higher indicates a high risk for opioid abuse          59 y/o M presents to PST for an evaluation for a left knee arthroscopy, medial and lateral meniscectomy.  PMH of HTN, gout, anmd high cholesterol.  PSX of hand surgery.  Patient reports his left knee pain began on 2/3/2021 while walking. 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 He is having intermittent pain which he describes as achy and throbbing 3/10 to 5/10. He explains that rest  ice and advil  alleviates his pain. Standing and walking exacerbates his pain. Pt stated " something is moving in the medial side of the knee". He experiences buckling and stiffness while walkinat times.  Pt stated he has tried physical therapy in his younger years which has helped some but at this point just wants his knee fixed.    Patient was educated on preoperative preperation with written and verbal instruction . Patient is going for medical clearance with Dr. Martin [phone] patient will review medications with PCP. Patient was educated on aspirin and aspirin products NSAIDs ,vitamins and herbals that increase the risk of bleeding and need to be stopped five days before procedure  . Patient was also educated on covid testing and covid prevention ,social distancing and wearing a mask.

## 2021-04-27 NOTE — H&P PST ADULT - RESPIRATORY
Patient transported to [address] [place], [place] [address],[address], RN  02/11/19 6285 detailed exam

## 2021-04-27 NOTE — H&P PST ADULT - NSANTHOSAYNRD_GEN_A_CORE
No. NICO screening performed.  STOP BANG Legend: 0-2 = LOW Risk; 3-4 = INTERMEDIATE Risk; 5-8 = HIGH Risk

## 2021-04-27 NOTE — H&P PST ADULT - NSICDXPROBLEM_GEN_ALL_CORE_FT
PROBLEM DIAGNOSES  Problem: HTN (hypertension)  Assessment and Plan: pt needs medical clearance.  Pt will cont with current medications and cont to check his BP.  Pt to discuss medications with his PCP prior to surgery    Problem: High cholesterol  Assessment and Plan: pt need medical clearance. pt to cont with medications and discuss meds with md prior to surgery    Problem: Need for prophylactic measure  Assessment and Plan: caprini score 4 SCDs ordered. Surgical team to evaluate the need for pharmacologic measures    Problem: Tear of medial meniscus of left knee  Assessment and Plan: pt scheduled for arthroscopic surgery with Dr Deluna 5/4/2021    Problem: Tear of lateral meniscus of left knee  Assessment and Plan: pt scheduled for arthroscopic surgery with Dr. Deluna 5/4/2021

## 2021-04-27 NOTE — H&P PST ADULT - HISTORY OF PRESENT ILLNESS
59 y/o M presents to [facility] for an evaluation for a left knee arthroscopy, medial and lateral meniscectomy.  PMH of HTN, gout, anmd high cholesterol.  PSX of hand surgery.  Patient reports his left knee pain began on 2/3/2021 while walking. 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 He is having intermittent pain which he describes as achy and throbbing 3/10 to 5/10. He explains that rest  ice and advil  alleviates his pain. Standing and walking exacerbates his pain. Pt stated " something is moving in the medial side of the knee". He experiences buckling and stiffness while walkinat times.  Pt stated he has tried physical therapy in his younger years which has helped some but at this point just wants his knee fixed.

## 2021-04-27 NOTE — H&P PST ADULT - NSICDXFAMILYHX_GEN_ALL_CORE_FT
FAMILY HISTORY:  Father  Still living? Yes, Estimated age: Age Unknown  FHx: diabetes mellitus, Age at diagnosis: Age Unknown

## 2021-04-27 NOTE — ASU PATIENT PROFILE, ADULT - NS PRO PT RIGHT SUPPORT PERSON
Orthopaedic Discharge Summary  Jennifer Bonilla, 1970   [id number], [id number]    Admission Date: 08/16/2018  Discharge Date: 08/16/2018     Chief Complaint: left knee pain   Principal Diagnosis: medial meniscus tear left knee    Secondary Diagnosis: osteoarthritis left knee       History of Present Illness: Jennifer Bonilla is a 47 y.o. female       Hospital Course:  On 08/16/2018 , the patient arrived to the [facility] on the second floor of [facility] for proper pre-operative management.  Upon completion of pre-operative preparation, the patient was taken back to the operative theatre.  A left knee arthroscopy was performed without complication and the patient was transported to the post anesthesia care unit in stable condition. After appropriate recovery from the anaesthetic agents used during the surgery the patient was discharged home.    Disposition: Stable. Discharged to home.    Activity: As tolerated.    Diet: Regular    Follow-up: Patient will follow-up with orthopedic clinic in one week for wound check and re-evaluation of the post-operative plan.     Medications: The patient will be sent home with the following medications-  1) ASA 81 mg by mouth once daily for one month      
same name as above

## 2021-04-29 NOTE — PHYSICAL EXAM
[JVD] : no jugular venous distention  [Carotid Bruits] : no carotid bruits [Normal Breath Sounds] : Normal breath sounds [Normal Heart Sounds] : normal heart sounds [2+] : left 2+ [Ankle Swelling (On Exam)] : not present [Varicose Veins Of Lower Extremities] : not present [] : not present [de-identified] : awake alert [de-identified] : MOM, PERRLA [de-identified] : leg swelling, no gangrene

## 2021-04-29 NOTE — HISTORY OF PRESENT ILLNESS
[FreeTextEntry1] : 61 y/o man with swelling discoloration and burning of both lower extremities. Comes for evaluation of lower extremity circulation. No fever chills, nausea or vomiting.

## 2021-04-29 NOTE — ASSESSMENT
[FreeTextEntry1] : 61 y/o man with swelling discoloration and burning of both lower extremities. Comes for evaluation of lower extremity circulation. Reassured circulation is ok. Should use support stockings, leg elevation. has a component of arthritis, should see Ortho.\par \par Plan:\par -Continue meds\par -Ambulation, elevation\par -RTC as needed

## 2021-04-30 ENCOUNTER — APPOINTMENT (OUTPATIENT)
Dept: FAMILY MEDICINE | Facility: CLINIC | Age: 61
End: 2021-04-30
Payer: COMMERCIAL

## 2021-04-30 VITALS
BODY MASS INDEX: 27.72 KG/M2 | HEART RATE: 60 BPM | HEIGHT: 71 IN | TEMPERATURE: 98 F | OXYGEN SATURATION: 98 % | WEIGHT: 198 LBS | DIASTOLIC BLOOD PRESSURE: 74 MMHG | SYSTOLIC BLOOD PRESSURE: 132 MMHG

## 2021-04-30 DIAGNOSIS — Z23 ENCOUNTER FOR IMMUNIZATION: ICD-10-CM

## 2021-04-30 DIAGNOSIS — M79.89 OTHER SPECIFIED SOFT TISSUE DISORDERS: ICD-10-CM

## 2021-04-30 DIAGNOSIS — H11.89 OTHER SPECIFIED DISORDERS OF CONJUNCTIVA: ICD-10-CM

## 2021-04-30 DIAGNOSIS — Z01.818 ENCOUNTER FOR OTHER PREPROCEDURAL EXAMINATION: ICD-10-CM

## 2021-04-30 DIAGNOSIS — I87.2 VENOUS INSUFFICIENCY (CHRONIC) (PERIPHERAL): ICD-10-CM

## 2021-04-30 DIAGNOSIS — S83.242A OTHER TEAR OF MEDIAL MENISCUS, CURRENT INJURY, LEFT KNEE, INITIAL ENCOUNTER: ICD-10-CM

## 2021-04-30 DIAGNOSIS — S83.282D OTHER TEAR OF LATERAL MENISCUS, CURRENT INJURY, LEFT KNEE, SUBSEQUENT ENCOUNTER: ICD-10-CM

## 2021-04-30 DIAGNOSIS — Z87.898 PERSONAL HISTORY OF OTHER SPECIFIED CONDITIONS: ICD-10-CM

## 2021-04-30 PROBLEM — E78.00 PURE HYPERCHOLESTEROLEMIA, UNSPECIFIED: Chronic | Status: ACTIVE | Noted: 2021-04-27

## 2021-04-30 PROBLEM — M10.9 GOUT, UNSPECIFIED: Chronic | Status: ACTIVE | Noted: 2021-04-27

## 2021-04-30 PROCEDURE — 99072 ADDL SUPL MATRL&STAF TM PHE: CPT

## 2021-04-30 PROCEDURE — 99214 OFFICE O/P EST MOD 30 MIN: CPT

## 2021-04-30 NOTE — RESULTS/DATA
[] : results reviewed [de-identified] : no change 2018[facility]

## 2021-04-30 NOTE — HISTORY OF PRESENT ILLNESS
[Aortic Stenosis] : no aortic stenosis [Atrial Fibrillation] : no atrial fibrillation [Coronary Artery Disease] : no coronary artery disease [Recent Myocardial Infarction] : no recent myocardial infarction [Implantable Device/Pacemaker] : no implantable device/pacemaker [Asthma] : no asthma [COPD] : no COPD [Sleep Apnea] : no sleep apnea [Smoker] : not a smoker [Family Member] : no family member with adverse anesthesia reaction/sudden death [Self] : no previous adverse anesthesia reaction [Chronic Anticoagulation] : no chronic anticoagulation [Diabetes] : no diabetes [FreeTextEntry1] : Left Knee Arthroscopy and medial and lateral meniscectomy [FreeTextEntry2] : 5/4/2021 [FreeTextEntry3] : Dr. Willam Deluna [FreeTextEntry4] : Pt is having knee surgery on 5/4/202 due to chronic knee pain.\par Pt states his son will be driving him to the procedure

## 2021-04-30 NOTE — PHYSICAL EXAM
[No Acute Distress] : no acute distress [Normal TMs] : both tympanic membranes were normal [Supple] : supple [Clear to Auscultation] : lungs were clear to auscultation bilaterally [Regular Rhythm] : with a regular rhythm [Normal S1, S2] : normal S1 and S2 [Soft] : abdomen soft [Normal Anterior Cervical Nodes] : no anterior cervical lymphadenopathy [No CVA Tenderness] : no CVA  tenderness [No Focal Deficits] : no focal deficits [Normal Affect] : the affect was normal [de-identified] : pos  pigment changesin legs bl , mild about ankle

## 2021-05-01 ENCOUNTER — APPOINTMENT (OUTPATIENT)
Dept: DISASTER EMERGENCY | Facility: CLINIC | Age: 61
End: 2021-05-01

## 2021-05-02 LAB — SARS-COV-2 N GENE NPH QL NAA+PROBE: NOT DETECTED

## 2021-05-03 ENCOUNTER — TRANSCRIPTION ENCOUNTER (OUTPATIENT)
Age: 61
End: 2021-05-03

## 2021-05-04 ENCOUNTER — APPOINTMENT (OUTPATIENT)
Dept: ORTHOPEDIC SURGERY | Facility: HOSPITAL | Age: 61
End: 2021-05-04

## 2021-05-04 ENCOUNTER — OUTPATIENT (OUTPATIENT)
Dept: OUTPATIENT SERVICES | Facility: HOSPITAL | Age: 61
LOS: 1 days | End: 2021-05-04
Payer: COMMERCIAL

## 2021-05-04 VITALS
HEART RATE: 66 BPM | SYSTOLIC BLOOD PRESSURE: 147 MMHG | OXYGEN SATURATION: 99 % | RESPIRATION RATE: 17 BRPM | TEMPERATURE: 97 F | DIASTOLIC BLOOD PRESSURE: 74 MMHG

## 2021-05-04 VITALS
HEART RATE: 66 BPM | HEIGHT: 71 IN | SYSTOLIC BLOOD PRESSURE: 140 MMHG | DIASTOLIC BLOOD PRESSURE: 91 MMHG | RESPIRATION RATE: 16 BRPM | OXYGEN SATURATION: 100 % | TEMPERATURE: 97 F | WEIGHT: 199.96 LBS

## 2021-05-04 DIAGNOSIS — Z98.890 OTHER SPECIFIED POSTPROCEDURAL STATES: Chronic | ICD-10-CM

## 2021-05-04 DIAGNOSIS — S83.282A OTHER TEAR OF LATERAL MENISCUS, CURRENT INJURY, LEFT KNEE, INITIAL ENCOUNTER: ICD-10-CM

## 2021-05-04 DIAGNOSIS — S83.242A OTHER TEAR OF MEDIAL MENISCUS, CURRENT INJURY, LEFT KNEE, INITIAL ENCOUNTER: ICD-10-CM

## 2021-05-04 LAB
ABO RH CONFIRMATION: SIGNIFICANT CHANGE UP
GLUCOSE BLDC GLUCOMTR-MCNC: 103 MG/DL — HIGH (ref 70–99)

## 2021-05-04 PROCEDURE — 29881 ARTHRS KNE SRG MNISECTMY M/L: CPT | Mod: LT

## 2021-05-04 PROCEDURE — 36415 COLL VENOUS BLD VENIPUNCTURE: CPT

## 2021-05-04 PROCEDURE — ZZZZZ: CPT

## 2021-05-04 PROCEDURE — 82962 GLUCOSE BLOOD TEST: CPT

## 2021-05-04 RX ORDER — ATORVASTATIN CALCIUM 80 MG/1
1 TABLET, FILM COATED ORAL
Qty: 0 | Refills: 0 | DISCHARGE

## 2021-05-04 RX ORDER — AMLODIPINE BESYLATE 2.5 MG/1
1 TABLET ORAL
Qty: 0 | Refills: 0 | DISCHARGE

## 2021-05-04 RX ORDER — OXYCODONE AND ACETAMINOPHEN 5; 325 MG/1; MG/1
2 TABLET ORAL EVERY 4 HOURS
Refills: 0 | Status: DISCONTINUED | OUTPATIENT
Start: 2021-05-04 | End: 2021-05-04

## 2021-05-04 RX ORDER — OXYCODONE AND ACETAMINOPHEN 5; 325 MG/1; MG/1
1 TABLET ORAL EVERY 4 HOURS
Refills: 0 | Status: DISCONTINUED | OUTPATIENT
Start: 2021-05-04 | End: 2021-05-04

## 2021-05-04 RX ORDER — CEFAZOLIN SODIUM 1 G
2000 VIAL (EA) INJECTION ONCE
Refills: 0 | Status: DISCONTINUED | OUTPATIENT
Start: 2021-05-04 | End: 2021-05-04

## 2021-05-04 RX ORDER — ONDANSETRON 8 MG/1
4 TABLET, FILM COATED ORAL EVERY 4 HOURS
Refills: 0 | Status: DISCONTINUED | OUTPATIENT
Start: 2021-05-04 | End: 2021-05-04

## 2021-05-04 RX ORDER — FENTANYL CITRATE 50 UG/ML
50 INJECTION INTRAVENOUS
Refills: 0 | Status: DISCONTINUED | OUTPATIENT
Start: 2021-05-04 | End: 2021-05-04

## 2021-05-04 RX ORDER — ALLOPURINOL 300 MG
0 TABLET ORAL
Qty: 0 | Refills: 0 | DISCHARGE

## 2021-05-04 RX ADMIN — OXYCODONE AND ACETAMINOPHEN 1 TABLET(S): 5; 325 TABLET ORAL at 09:43

## 2021-05-04 RX ADMIN — OXYCODONE AND ACETAMINOPHEN 1 TABLET(S): 5; 325 TABLET ORAL at 09:44

## 2021-05-04 NOTE — ASU DISCHARGE PLAN (ADULT/PEDIATRIC) - CARE PROVIDER_API CALL
Willam Deluna)  Orthopaedic Surgery  [address]  [place] [address]  Phone: [phone]  Fax: [phone]  Follow Up Time:

## 2021-05-04 NOTE — BRIEF OPERATIVE NOTE - NSICDXBRIEFPOSTOP_GEN_ALL_CORE_FT
POST-OP DIAGNOSIS:  Complex tear of medial meniscus of left knee 04-May-2021 08:45:49  Willam Deluna

## 2021-05-04 NOTE — ASU DISCHARGE PLAN (ADULT/PEDIATRIC) - DIET/FLUID RESTRICTION
SUBJECTIVE  This is a 65 year old new patient who is complaining of bump on the RLL x 2 months, per wife. He has no other complaints. He denies changes in vision. He has history of no surgery or trauma to the eye.      ROS:  General: No fever/chills  HENT: No ear pain  Respiratory: No cough  Heart: No chest discomfort  Gastroenterology: No abdominal pain  Urology: No dysuria  Musculoskeletal: No backpain  Neuro: No syncope  Psychiatric: No depression  Endocrine: No polydipsia/polyuria    Past Medical History  Alzheimer's per wife      Medicine  Unknown  Patient/wife did not bring    Allergies     ALLERGIES: no known allergies.  Social History     Tobacco: No  Family History      None    OBJECTIVE:  Vision                       RIGHT: With correction  20/30      LEFT: With correction  20/40    ph= NI    Rocio Quiroz 2:40 PM 2/10/2020    Lids/Adnexa            RIGHT: lower lid chalazion             LEFT: upper and lower lids normal  Pupils                       RIGHT: normal         LEFT: normal  EOM                         RIGHT: full             LEFT: full  Alignment     - orthophoric at distance and near      SLIT LAMP EXAM  Lids/Adnexa            RIGHT: upper and lower lids normal             LEFT: upper and lower lids normal  Conjunctiva             RIGHT: - normal     LEFT: - normal  Cornea                    RIGHT: - clear  LEFT: - clear  Tear Film                 RIGHT: good  LEFT: good  Anterior Chamber    RIGHT: normal deep and quiet  LEFT: normal deep and quiet  Iris                             RIGHT: - normal  LEFT: - normal  Lens                        RIGHT: - clear  LEFT: - clear      ASSESSMENT:  right lower lid chala. moderate.    PLAN  Hot moist compress four times a day right with massage.    Call back if the symptoms don't improve, or there are new symptoms.  Otherwise follow-up is as needed.    may come back for injection.    Amanuel Vargas MD 2/10/2020   
No change

## 2021-05-04 NOTE — PHYSICAL THERAPY INITIAL EVALUATION ADULT - ADDITIONAL COMMENTS
Pt lives with wife in a 1 story house with 3 steps to enter.  Independent with all PTA, without devices.

## 2021-05-04 NOTE — BRIEF OPERATIVE NOTE - NSICDXBRIEFPREOP_GEN_ALL_CORE_FT
PRE-OP DIAGNOSIS:  Complex tear of medial meniscus of left knee 04-May-2021 08:45:32  Willam Deluna

## 2021-05-17 ENCOUNTER — APPOINTMENT (OUTPATIENT)
Dept: ORTHOPEDIC SURGERY | Facility: CLINIC | Age: 61
End: 2021-05-17
Payer: COMMERCIAL

## 2021-05-17 PROCEDURE — 99024 POSTOP FOLLOW-UP VISIT: CPT

## 2021-05-17 NOTE — HISTORY OF PRESENT ILLNESS
[Procedure: ___] : status post [unfilled] [Clean/Dry/Intact] : clean, dry and intact [Healed] : healed [Swelling] : swollen [Neuro Intact] : an unremarkable neurological exam [Vascular Intact] : ~T peripheral vascular exam normal [Negative Nely's] : maneuvers demonstrated a negative Nely's sign [Doing Well] : is doing well [No Sign of Infection] : is showing no signs of infection [2] : the patient reports pain that is 2/10 in severity [Chills] : no chills [Constipation] : no constipation [Diarrhea] : no diarrhea [Dysuria] : no dysuria [Fever] : no fever [Nausea] : no nausea [Vomiting] : no vomiting [Erythema] : not erythematous [Discharge] : absent of discharge [Dehiscence] : not dehisced [de-identified] : s/p left knee arthroscopy. Left knee partial medial meniscectomy. 5/4/21 [de-identified] : Pt is 2 weeks post-op. He has not started PT yet. He walks about one mile for exercise. The patient ambulates without any assistive devices. He is not ready to return to work.  [de-identified] : Left knee exam shows healed scope sites with no sign of infection.  [de-identified] : no xrays [de-identified] : We prescribed outpatient PT. He understands the importance of doing low impact exercises. We prescribed Diclofenac today for pain management. F/u with us in 3 weeks at which point we will reevaluate the pt and discuss when he will return to work.

## 2021-06-01 ENCOUNTER — RX RENEWAL (OUTPATIENT)
Age: 61
End: 2021-06-01

## 2021-06-15 ENCOUNTER — APPOINTMENT (OUTPATIENT)
Dept: ORTHOPEDIC SURGERY | Facility: CLINIC | Age: 61
End: 2021-06-15
Payer: COMMERCIAL

## 2021-06-15 VITALS
DIASTOLIC BLOOD PRESSURE: 75 MMHG | HEART RATE: 66 BPM | SYSTOLIC BLOOD PRESSURE: 143 MMHG | BODY MASS INDEX: 27.72 KG/M2 | HEIGHT: 71 IN | WEIGHT: 198 LBS

## 2021-06-15 PROCEDURE — 99024 POSTOP FOLLOW-UP VISIT: CPT

## 2021-06-15 NOTE — RETURN TO WORK/SCHOOL
[Return Date: _____] : as of [unfilled].  This has been discussed in detail with ~Mulu~ and ~he/she~ understands this. [Full Duty] : full duty

## 2021-06-15 NOTE — HISTORY OF PRESENT ILLNESS
[1] : the patient reports pain that is 1/10 in severity [Doing Well] : is doing well [Excellent Pain Control] : has excellent pain control [No Sign of Infection] : is showing no signs of infection [Chills] : no chills [Constipation] : no constipation [Diarrhea] : no diarrhea [Dysuria] : no dysuria [Fever] : no fever [Nausea] : no nausea [Vomiting] : no vomiting [de-identified] : status post 5/4/2021. s/p left knee arthroscopy. Left knee partial medial meniscectomy. 5/4/21. [de-identified] : he is 7 weeks from sx\par he is in PT 2 times per week\par  report occasional pain in the joint line and tylenol is sufficient for the pain.\par he is walking without using any assistive devise  [de-identified] : Left knee exam shows healed scope sites with no sign of infection. The surgical incision site(s) swollen, but clean, dry and intact, healed, not erythematous, absent of discharge and not dehisced. Additional findings included an unremarkable neurological exam, peripheral vascular exam normal and maneuvers demonstrated a negative Nely's sign. \par \par ROM is 0-120 degree [de-identified] : no xray [de-identified] : He understands the importance of doing low impact exercises. clear to return to work on 7/6/21 F/u with us in 4 weeks \par

## 2021-07-06 ENCOUNTER — RX RENEWAL (OUTPATIENT)
Age: 61
End: 2021-07-06

## 2021-07-12 NOTE — END OF VISIT
[FreeTextEntry3] : I, Byron Mattson, acted solely as a scribe for Dr. Willam Deluna on this date 05/17/2021. 
nonverbal cues (demo/gestures)

## 2021-07-21 ENCOUNTER — APPOINTMENT (OUTPATIENT)
Dept: ORTHOPEDIC SURGERY | Facility: CLINIC | Age: 61
End: 2021-07-21
Payer: COMMERCIAL

## 2021-07-21 VITALS
WEIGHT: 198 LBS | HEIGHT: 71 IN | BODY MASS INDEX: 27.72 KG/M2 | HEART RATE: 60 BPM | DIASTOLIC BLOOD PRESSURE: 75 MMHG | SYSTOLIC BLOOD PRESSURE: 142 MMHG

## 2021-07-21 DIAGNOSIS — Z98.890 OTHER SPECIFIED POSTPROCEDURAL STATES: ICD-10-CM

## 2021-07-21 PROCEDURE — 99024 POSTOP FOLLOW-UP VISIT: CPT

## 2021-09-01 ENCOUNTER — LABORATORY RESULT (OUTPATIENT)
Age: 61
End: 2021-09-01

## 2021-09-01 ENCOUNTER — APPOINTMENT (OUTPATIENT)
Dept: FAMILY MEDICINE | Facility: CLINIC | Age: 61
End: 2021-09-01
Payer: COMMERCIAL

## 2021-09-01 ENCOUNTER — NON-APPOINTMENT (OUTPATIENT)
Age: 61
End: 2021-09-01

## 2021-09-01 VITALS
HEART RATE: 68 BPM | OXYGEN SATURATION: 98 % | BODY MASS INDEX: 27.72 KG/M2 | DIASTOLIC BLOOD PRESSURE: 82 MMHG | WEIGHT: 198 LBS | SYSTOLIC BLOOD PRESSURE: 134 MMHG | TEMPERATURE: 97.8 F | HEIGHT: 71 IN

## 2021-09-01 PROCEDURE — 81003 URINALYSIS AUTO W/O SCOPE: CPT | Mod: QW

## 2021-09-01 PROCEDURE — 93000 ELECTROCARDIOGRAM COMPLETE: CPT

## 2021-09-01 PROCEDURE — 99396 PREV VISIT EST AGE 40-64: CPT | Mod: 25

## 2021-09-01 PROCEDURE — 99212 OFFICE O/P EST SF 10 MIN: CPT | Mod: 25

## 2021-09-01 PROCEDURE — 36415 COLL VENOUS BLD VENIPUNCTURE: CPT

## 2021-09-01 RX ORDER — HYDROCODONE BITARTRATE AND ACETAMINOPHEN 5; 325 MG/1; MG/1
5-325 TABLET ORAL
Qty: 20 | Refills: 0 | Status: DISCONTINUED | COMMUNITY
Start: 2021-05-04 | End: 2021-09-01

## 2021-09-01 RX ORDER — DICLOFENAC SODIUM 75 MG/1
75 TABLET, DELAYED RELEASE ORAL
Qty: 30 | Refills: 0 | Status: DISCONTINUED | COMMUNITY
Start: 2021-05-17 | End: 2021-09-01

## 2021-09-02 NOTE — HISTORY OF PRESENT ILLNESS
[FreeTextEntry1] : Patient in office for annual physical exam. , pt had knee surg and doing better [de-identified] : pt notes no complaints, aside form a nnocturnal cough for about one week  pt had both covid vaccine s

## 2021-09-02 NOTE — HEALTH RISK ASSESSMENT
[1 or 2 (0 pts)] : 1 or 2 (0 points) [Never (0 pts)] : Never (0 points) [No] : In the past 12 months have you used drugs other than those required for medical reasons? No [No falls in past year] : Patient reported no falls in the past year [0] : 2) Feeling down, depressed, or hopeless: Not at all (0) [HIV test declined] : HIV test declined [Hepatitis C test declined] : Hepatitis C test declined [With Family] : lives with family [] :  [Good] : ~his/her~ current health as good [Very Good] : ~his/her~  mood as very good [FreeTextEntry1] : physical, nocturnal cough  [Audit-CScore] : 0 [] : No [[id number]] : 0

## 2021-09-02 NOTE — REVIEW OF SYSTEMS
[Cough] : cough [Fever] : no fever [Earache] : no earache [Chest Pain] : no chest pain [Shortness Of Breath] : no shortness of breath [Wheezing] : no wheezing [Abdominal Pain] : no abdominal pain [FreeTextEntry4] : pt notes some recent pnd and clear of throat  [FreeTextEntry6] : no loss of taste or smell

## 2021-09-09 LAB
ALBUMIN SERPL ELPH-MCNC: 4.5 G/DL
ALP BLD-CCNC: 104 U/L
ALT SERPL-CCNC: 42 U/L
ANION GAP SERPL CALC-SCNC: 13 MMOL/L
AST SERPL-CCNC: 38 U/L
BASOPHILS # BLD AUTO: 0.05 K/UL
BASOPHILS NFR BLD AUTO: 1.1 %
BILIRUB SERPL-MCNC: 0.2 MG/DL
BUN SERPL-MCNC: 21 MG/DL
CALCIUM SERPL-MCNC: 9.1 MG/DL
CHLORIDE SERPL-SCNC: 110 MMOL/L
CHOLEST SERPL-MCNC: 135 MG/DL
CO2 SERPL-SCNC: 20 MMOL/L
CREAT SERPL-MCNC: 1.35 MG/DL
EOSINOPHIL # BLD AUTO: 0.16 K/UL
EOSINOPHIL NFR BLD AUTO: 3.5 %
GLUCOSE SERPL-MCNC: 94 MG/DL
HCT VFR BLD CALC: 40.2 %
HDLC SERPL-MCNC: 29 MG/DL
HGB BLD-MCNC: 12.9 G/DL
IMM GRANULOCYTES NFR BLD AUTO: 0 %
LDLC SERPL CALC-MCNC: 64 MG/DL
LYMPHOCYTES # BLD AUTO: 2.04 K/UL
LYMPHOCYTES NFR BLD AUTO: 44.2 %
MAN DIFF?: NORMAL
MCHC RBC-ENTMCNC: 30.9 PG
MCHC RBC-ENTMCNC: 32.1 GM/DL
MCV RBC AUTO: 96.2 FL
MONOCYTES # BLD AUTO: 0.32 K/UL
MONOCYTES NFR BLD AUTO: 6.9 %
NEUTROPHILS # BLD AUTO: 2.05 K/UL
NEUTROPHILS NFR BLD AUTO: 44.3 %
NONHDLC SERPL-MCNC: 105 MG/DL
PLATELET # BLD AUTO: 197 K/UL
POTASSIUM SERPL-SCNC: 4.7 MMOL/L
PROT SERPL-MCNC: 7.1 G/DL
PSA FREE FLD-MCNC: 21 %
PSA FREE SERPL-MCNC: 0.15 NG/ML
PSA SERPL-MCNC: 0.72 NG/ML
RBC # BLD: 4.18 M/UL
RBC # FLD: 13.5 %
SODIUM SERPL-SCNC: 143 MMOL/L
TRIGL SERPL-MCNC: 209 MG/DL
TSH SERPL-ACNC: 2.33 UIU/ML
WBC # FLD AUTO: 4.62 K/UL

## 2021-11-19 ENCOUNTER — RX RENEWAL (OUTPATIENT)
Age: 61
End: 2021-11-19

## 2021-12-22 ENCOUNTER — APPOINTMENT (OUTPATIENT)
Dept: FAMILY MEDICINE | Facility: CLINIC | Age: 61
End: 2021-12-22
Payer: COMMERCIAL

## 2021-12-22 VITALS
WEIGHT: 198 LBS | TEMPERATURE: 98.1 F | BODY MASS INDEX: 27.62 KG/M2 | HEART RATE: 78 BPM | SYSTOLIC BLOOD PRESSURE: 136 MMHG | OXYGEN SATURATION: 98 % | DIASTOLIC BLOOD PRESSURE: 80 MMHG | RESPIRATION RATE: 16 BRPM

## 2021-12-22 DIAGNOSIS — K29.70 GASTRITIS, UNSPECIFIED, W/OUT BLEEDING: ICD-10-CM

## 2021-12-22 PROCEDURE — 99214 OFFICE O/P EST MOD 30 MIN: CPT | Mod: 25

## 2021-12-22 PROCEDURE — 36415 COLL VENOUS BLD VENIPUNCTURE: CPT

## 2021-12-22 NOTE — HISTORY OF PRESENT ILLNESS
[FreeTextEntry1] : Patient is here to follow up on blood work review. pt was found to be anemic, p[t notes no blood in stool, pt urien was neg, colon neg aside from osis in 2014, pt las thgb was 12.9, pt reports his bp med was 10mg , and not amlodipiune, pt notes takes chol med, pt notes been feeling good \par  [de-identified] : Patient reports some upper abdominal discomfort when eats spicy foods or tomato sauce, patient took wife's antacid with relief

## 2021-12-22 NOTE — PHYSICAL EXAM
[No Acute Distress] : no acute distress [Normal TMs] : both tympanic membranes were normal [Supple] : supple [Clear to Auscultation] : lungs were clear to auscultation bilaterally [Normal S1, S2] : normal S1 and S2 [Soft] : abdomen soft

## 2021-12-26 LAB
APPEARANCE: CLEAR
BACTERIA: NEGATIVE
BASOPHILS # BLD AUTO: 0.06 K/UL
BASOPHILS NFR BLD AUTO: 1.1 %
BILIRUBIN URINE: NEGATIVE
BLOOD URINE: NEGATIVE
COLOR: NORMAL
EOSINOPHIL # BLD AUTO: 0.14 K/UL
EOSINOPHIL NFR BLD AUTO: 2.6 %
GLUCOSE QUALITATIVE U: NEGATIVE
H PYLORI AB SER-ACNC: <5 UNITS
H PYLORI IGA SER-ACNC: 17.4 UNITS
HCT VFR BLD CALC: 41 %
HGB BLD-MCNC: 13.5 G/DL
HYALINE CASTS: 1 /LPF
IMM GRANULOCYTES NFR BLD AUTO: 0.2 %
KETONES URINE: NEGATIVE
LEUKOCYTE ESTERASE URINE: NEGATIVE
LYMPHOCYTES # BLD AUTO: 2.19 K/UL
LYMPHOCYTES NFR BLD AUTO: 40.2 %
MAN DIFF?: NORMAL
MCHC RBC-ENTMCNC: 30.5 PG
MCHC RBC-ENTMCNC: 32.9 GM/DL
MCV RBC AUTO: 92.8 FL
MICROSCOPIC-UA: NORMAL
MONOCYTES # BLD AUTO: 0.46 K/UL
MONOCYTES NFR BLD AUTO: 8.4 %
NEUTROPHILS # BLD AUTO: 2.59 K/UL
NEUTROPHILS NFR BLD AUTO: 47.5 %
NITRITE URINE: NEGATIVE
PH URINE: 6
PLATELET # BLD AUTO: 212 K/UL
PROTEIN URINE: NEGATIVE
RBC # BLD: 4.42 M/UL
RBC # FLD: 13 %
RED BLOOD CELLS URINE: 2 /HPF
SPECIFIC GRAVITY URINE: 1.02
SQUAMOUS EPITHELIAL CELLS: 1 /HPF
UROBILINOGEN URINE: NORMAL
WBC # FLD AUTO: 5.45 K/UL
WHITE BLOOD CELLS URINE: 3 /HPF

## 2022-05-04 ENCOUNTER — RX RENEWAL (OUTPATIENT)
Age: 62
End: 2022-05-04

## 2022-06-01 ENCOUNTER — RX RENEWAL (OUTPATIENT)
Age: 62
End: 2022-06-01

## 2022-06-06 ENCOUNTER — RX RENEWAL (OUTPATIENT)
Age: 62
End: 2022-06-06

## 2022-06-17 ENCOUNTER — NON-APPOINTMENT (OUTPATIENT)
Age: 62
End: 2022-06-17

## 2022-06-17 ENCOUNTER — APPOINTMENT (OUTPATIENT)
Dept: FAMILY MEDICINE | Facility: CLINIC | Age: 62
End: 2022-06-17
Payer: COMMERCIAL

## 2022-06-17 VITALS
WEIGHT: 198 LBS | OXYGEN SATURATION: 98 % | HEIGHT: 71 IN | HEART RATE: 66 BPM | DIASTOLIC BLOOD PRESSURE: 80 MMHG | SYSTOLIC BLOOD PRESSURE: 150 MMHG | RESPIRATION RATE: 17 BRPM | BODY MASS INDEX: 27.72 KG/M2

## 2022-06-17 VITALS — DIASTOLIC BLOOD PRESSURE: 80 MMHG | SYSTOLIC BLOOD PRESSURE: 130 MMHG

## 2022-06-17 PROCEDURE — 99214 OFFICE O/P EST MOD 30 MIN: CPT | Mod: 25

## 2022-06-17 PROCEDURE — 93000 ELECTROCARDIOGRAM COMPLETE: CPT

## 2022-06-17 RX ORDER — FEXOFENADINE HYDROCHLORIDE 180 MG/1
180 TABLET ORAL
Qty: 30 | Refills: 3 | Status: DISCONTINUED | COMMUNITY
Start: 2021-09-01 | End: 2022-06-17

## 2022-06-17 NOTE — HISTORY OF PRESENT ILLNESS
[FreeTextEntry1] : Patient is following up on Hypertension. Pt also c/o of episodic pressure in upper stomach and chest that causes a cough x 2 months, pt notes last couple, of days has resolved, pt notes had covid 2 years ago, pt notes this episode was mainly in eveming , pt note in interimm  recently 2 weeks ago saw gi and starte omeprazole and initial tight feelign that would make him cough resolved  [de-identified] : pt notes tigghtnes sin chest w cough is only when lie down, pt does not occur w exertion, ptsaw gi and given ppi for reflux and better butstil get from time to time

## 2022-06-17 NOTE — REVIEW OF SYSTEMS
[Fever] : no fever [Earache] : no earache [Chest Pain] : no chest pain [Shortness Of Breath] : no shortness of breath [Cough] : cough [FreeTextEntry5] : cvhest tight , not pain

## 2022-07-18 ENCOUNTER — RX RENEWAL (OUTPATIENT)
Age: 62
End: 2022-07-18

## 2022-08-17 ENCOUNTER — APPOINTMENT (OUTPATIENT)
Dept: FAMILY MEDICINE | Facility: CLINIC | Age: 62
End: 2022-08-17

## 2022-08-17 VITALS
TEMPERATURE: 98.6 F | OXYGEN SATURATION: 97 % | HEART RATE: 67 BPM | BODY MASS INDEX: 28.14 KG/M2 | SYSTOLIC BLOOD PRESSURE: 146 MMHG | DIASTOLIC BLOOD PRESSURE: 86 MMHG | HEIGHT: 71 IN | WEIGHT: 201 LBS

## 2022-08-17 VITALS — SYSTOLIC BLOOD PRESSURE: 140 MMHG | DIASTOLIC BLOOD PRESSURE: 76 MMHG

## 2022-08-17 DIAGNOSIS — R07.89 OTHER CHEST PAIN: ICD-10-CM

## 2022-08-17 DIAGNOSIS — R05.8 OTHER SPECIFIED COUGH: ICD-10-CM

## 2022-08-17 PROCEDURE — 99213 OFFICE O/P EST LOW 20 MIN: CPT

## 2022-09-11 ENCOUNTER — EMERGENCY (EMERGENCY)
Facility: HOSPITAL | Age: 62
LOS: 1 days | Discharge: LEFT BEFORE DISCHARGE | End: 2022-09-11
Payer: COMMERCIAL

## 2022-09-11 VITALS
DIASTOLIC BLOOD PRESSURE: 88 MMHG | HEART RATE: 79 BPM | HEIGHT: 71 IN | OXYGEN SATURATION: 98 % | TEMPERATURE: 98 F | SYSTOLIC BLOOD PRESSURE: 148 MMHG | RESPIRATION RATE: 18 BRPM | WEIGHT: 220.02 LBS

## 2022-09-11 DIAGNOSIS — Z98.890 OTHER SPECIFIED POSTPROCEDURAL STATES: Chronic | ICD-10-CM

## 2022-09-11 PROCEDURE — L9991: CPT

## 2022-09-11 PROCEDURE — 99283 EMERGENCY DEPT VISIT LOW MDM: CPT

## 2022-09-11 PROCEDURE — 93005 ELECTROCARDIOGRAM TRACING: CPT

## 2022-09-11 PROCEDURE — 93010 ELECTROCARDIOGRAM REPORT: CPT

## 2022-09-11 NOTE — ED ADULT TRIAGE NOTE - CHIEF COMPLAINT QUOTE
pt c/o headache and chest pain started 3hrs and high blood pressure. as per wife pt's watch shows A-fib rhythm rate 150 hx HTN

## 2022-09-13 ENCOUNTER — NON-APPOINTMENT (OUTPATIENT)
Age: 62
End: 2022-09-13

## 2022-09-13 ENCOUNTER — APPOINTMENT (OUTPATIENT)
Dept: CARDIOLOGY | Facility: CLINIC | Age: 62
End: 2022-09-13

## 2022-09-13 VITALS
DIASTOLIC BLOOD PRESSURE: 66 MMHG | SYSTOLIC BLOOD PRESSURE: 130 MMHG | TEMPERATURE: 98.2 F | HEART RATE: 63 BPM | WEIGHT: 198 LBS | OXYGEN SATURATION: 97 % | RESPIRATION RATE: 16 BRPM | BODY MASS INDEX: 27.62 KG/M2

## 2022-09-13 VITALS — DIASTOLIC BLOOD PRESSURE: 58 MMHG | SYSTOLIC BLOOD PRESSURE: 102 MMHG

## 2022-09-13 DIAGNOSIS — R01.1 CARDIAC MURMUR, UNSPECIFIED: ICD-10-CM

## 2022-09-13 DIAGNOSIS — Z86.79 PERSONAL HISTORY OF OTHER DISEASES OF THE CIRCULATORY SYSTEM: ICD-10-CM

## 2022-09-13 DIAGNOSIS — R51.9 HEADACHE, UNSPECIFIED: ICD-10-CM

## 2022-09-13 DIAGNOSIS — R06.02 SHORTNESS OF BREATH: ICD-10-CM

## 2022-09-13 DIAGNOSIS — Z87.898 PERSONAL HISTORY OF OTHER SPECIFIED CONDITIONS: ICD-10-CM

## 2022-09-13 PROCEDURE — 93000 ELECTROCARDIOGRAM COMPLETE: CPT

## 2022-09-13 PROCEDURE — 99215 OFFICE O/P EST HI 40 MIN: CPT | Mod: 25

## 2022-09-13 PROCEDURE — 99205 OFFICE O/P NEW HI 60 MIN: CPT | Mod: 25

## 2022-09-13 RX ORDER — ASPIRIN ENTERIC COATED TABLETS 81 MG 81 MG/1
81 TABLET, DELAYED RELEASE ORAL DAILY
Qty: 90 | Refills: 3 | Status: ACTIVE | COMMUNITY
Start: 2022-09-13 | End: 1900-01-01

## 2022-09-13 NOTE — HISTORY OF PRESENT ILLNESS
[FreeTextEntry1] : hypertension and headaches and chest pain . \par \par \par \par This is a 61 year old male with  history of hypertension and dyslipidemia ,  Gout, with chest pain and headacehs uncontrolled hypertension and afib \par over the weekend he was having headachs. and he checked his BP. and it was high >200. and his wife checked his heart rate and it was atrial fibrillation .  on iwatch.  at that time he was eating a lot of cheese that day. \par he recently did increase his BP meds doubled up lisinopril. 3 mths ago. he did not go to ER during day time.  he did take extra lisinopril that night. \par and at night he went to ER. he was there for 1 hr.  in ER the ECg was ok.  after an hour, he left because the Er was packed. \par he did not do the blood work in er.  all this happened on saturday . few days ago. \par he is feeling good now.  he does have chest pain . \par chest pressure. .  Onset:   Type: Pressure Duration: intermittne  days. intensity:  4/10, lasted for :  few mins;   Radiation:  NONE   Associated symptoms: none  Dyspnea. .\par no dyspnea on exertion . \par \par \par No smoking. no lacohol. no drugs\par \par Family history \par Mother:   + cerebrovascular accident .  hypertension \par father:  heart issues.

## 2022-09-13 NOTE — CARDIOLOGY SUMMARY
[de-identified] : 9 13 2022   Sinus  Rhythm \par -Incomplete right bundle branch block. \par ABNORMAL \par

## 2022-09-13 NOTE — DISCUSSION/SUMMARY
[Patient] : the patient [Risks] : risks [Benefits] : benefits [Alternatives] : alternatives [With Me] : with me [___ Month(s)] : in [unfilled] month(s) [FreeTextEntry1] : This is a 61 year old male with  history of hypertension and dyslipidemia ,  Gout, with chest pain and headacehs uncontrolled hypertension and afib \par \par \par 1) hypertensive emergency: Likely due to salty food. Avoid cheese. Meat.  now resolved. ct current meds.  diet and exercise. \par 2) chest pain : cardiac cta . 2D echo bp control; aspirin 81 . ct statins no beta blocker as heart rate 60 \par 3) atrial fibrillation : mnew onset: Iwatch diagnosis: 4 weeks MCOT monitor. aspirin 81. chadsvasc 1. if no afib, then iwatch for 1 year\par 4) dyslipidemia : ct statins \par 5) hypertension : ct crurent medss  [EKG obtained to assist in diagnosis and management of assessed problem(s)] : EKG obtained to assist in diagnosis and management of assessed problem(s)

## 2022-09-21 ENCOUNTER — RX RENEWAL (OUTPATIENT)
Age: 62
End: 2022-09-21

## 2022-09-22 ENCOUNTER — APPOINTMENT (OUTPATIENT)
Dept: CARDIOLOGY | Facility: CLINIC | Age: 62
End: 2022-09-22

## 2022-10-19 ENCOUNTER — RX RENEWAL (OUTPATIENT)
Age: 62
End: 2022-10-19

## 2022-10-24 ENCOUNTER — APPOINTMENT (OUTPATIENT)
Dept: CT IMAGING | Facility: CLINIC | Age: 62
End: 2022-10-24

## 2022-10-24 ENCOUNTER — OUTPATIENT (OUTPATIENT)
Dept: OUTPATIENT SERVICES | Facility: HOSPITAL | Age: 62
LOS: 1 days | End: 2022-10-24
Payer: COMMERCIAL

## 2022-10-24 DIAGNOSIS — Z98.890 OTHER SPECIFIED POSTPROCEDURAL STATES: Chronic | ICD-10-CM

## 2022-10-24 DIAGNOSIS — I10 ESSENTIAL (PRIMARY) HYPERTENSION: ICD-10-CM

## 2022-10-24 DIAGNOSIS — R94.31 ABNORMAL ELECTROCARDIOGRAM [ECG] [EKG]: ICD-10-CM

## 2022-10-24 DIAGNOSIS — Z00.8 ENCOUNTER FOR OTHER GENERAL EXAMINATION: ICD-10-CM

## 2022-10-24 DIAGNOSIS — E78.5 HYPERLIPIDEMIA, UNSPECIFIED: ICD-10-CM

## 2022-10-24 PROCEDURE — 75574 CT ANGIO HRT W/3D IMAGE: CPT | Mod: 26

## 2022-10-24 PROCEDURE — 75574 CT ANGIO HRT W/3D IMAGE: CPT

## 2022-12-13 ENCOUNTER — TRANSCRIPTION ENCOUNTER (OUTPATIENT)
Age: 62
End: 2022-12-13

## 2022-12-21 ENCOUNTER — APPOINTMENT (OUTPATIENT)
Dept: FAMILY MEDICINE | Facility: CLINIC | Age: 62
End: 2022-12-21
Payer: COMMERCIAL

## 2022-12-21 VITALS
OXYGEN SATURATION: 96 % | WEIGHT: 204 LBS | TEMPERATURE: 98.6 F | SYSTOLIC BLOOD PRESSURE: 130 MMHG | DIASTOLIC BLOOD PRESSURE: 70 MMHG | HEART RATE: 72 BPM | BODY MASS INDEX: 28.56 KG/M2 | HEIGHT: 71 IN

## 2022-12-21 DIAGNOSIS — F32.A DEPRESSION, UNSPECIFIED: ICD-10-CM

## 2022-12-21 PROCEDURE — 99214 OFFICE O/P EST MOD 30 MIN: CPT

## 2022-12-21 NOTE — HISTORY OF PRESENT ILLNESS
[FreeTextEntry1] : Pt following up on hypertension and hyperlipidemia. pt takes chol med daily, pt notes felling good\par pt notes tired [de-identified] : pt had ct coronary of 7, pt had flu shot, pt having colonoscopy this friday

## 2022-12-21 NOTE — HEALTH RISK ASSESSMENT
[Never] : Never [No] : In the past 12 months have you used drugs other than those required for medical reasons? No [2] : 1) Little interest or pleasure doing things for more than half of the days (2) [0] : 2) Feeling down, depressed, or hopeless: Not at all (0) [PHQ-9 Positive] : PHQ-9 Positive [I have developed a follow-up plan documented below in the note.] : I have developed a follow-up plan documented below in the note. [[id number]] : 2

## 2022-12-23 ENCOUNTER — NON-APPOINTMENT (OUTPATIENT)
Age: 62
End: 2022-12-23

## 2022-12-23 ENCOUNTER — RX RENEWAL (OUTPATIENT)
Age: 62
End: 2022-12-23

## 2023-01-23 ENCOUNTER — RX RENEWAL (OUTPATIENT)
Age: 63
End: 2023-01-23

## 2023-01-28 ENCOUNTER — NON-APPOINTMENT (OUTPATIENT)
Age: 63
End: 2023-01-28

## 2023-01-29 ENCOUNTER — EMERGENCY (EMERGENCY)
Facility: HOSPITAL | Age: 63
LOS: 1 days | Discharge: HOME | End: 2023-01-29
Attending: EMERGENCY MEDICINE
Payer: COMMERCIAL

## 2023-01-29 VITALS
OXYGEN SATURATION: 99 % | RESPIRATION RATE: 18 BRPM | HEART RATE: 141 BPM | SYSTOLIC BLOOD PRESSURE: 155 MMHG | DIASTOLIC BLOOD PRESSURE: 66 MMHG

## 2023-01-29 DIAGNOSIS — I48.91 UNSPECIFIED ATRIAL FIBRILLATION: ICD-10-CM

## 2023-01-29 DIAGNOSIS — Z98.890 OTHER SPECIFIED POSTPROCEDURAL STATES: Chronic | ICD-10-CM

## 2023-01-29 LAB
ALBUMIN SERPL ELPH-MCNC: 4.1 G/DL — SIGNIFICANT CHANGE UP (ref 3.3–5.2)
ALP SERPL-CCNC: 94 U/L — SIGNIFICANT CHANGE UP (ref 40–120)
ALT FLD-CCNC: 34 U/L — SIGNIFICANT CHANGE UP
ANION GAP SERPL CALC-SCNC: 9 MMOL/L — SIGNIFICANT CHANGE UP (ref 5–17)
AST SERPL-CCNC: 33 U/L — SIGNIFICANT CHANGE UP
BASOPHILS # BLD AUTO: 0.07 K/UL — SIGNIFICANT CHANGE UP (ref 0–0.2)
BASOPHILS NFR BLD AUTO: 1 % — SIGNIFICANT CHANGE UP (ref 0–2)
BILIRUB SERPL-MCNC: <0.2 MG/DL — LOW (ref 0.4–2)
BUN SERPL-MCNC: 15.5 MG/DL — SIGNIFICANT CHANGE UP (ref 8–20)
CALCIUM SERPL-MCNC: 8.8 MG/DL — SIGNIFICANT CHANGE UP (ref 8.4–10.5)
CHLORIDE SERPL-SCNC: 110 MMOL/L — HIGH (ref 96–108)
CO2 SERPL-SCNC: 24 MMOL/L — SIGNIFICANT CHANGE UP (ref 22–29)
CREAT SERPL-MCNC: 1.12 MG/DL — SIGNIFICANT CHANGE UP (ref 0.5–1.3)
EGFR: 75 ML/MIN/1.73M2 — SIGNIFICANT CHANGE UP
EOSINOPHIL # BLD AUTO: 0.13 K/UL — SIGNIFICANT CHANGE UP (ref 0–0.5)
EOSINOPHIL NFR BLD AUTO: 1.9 % — SIGNIFICANT CHANGE UP (ref 0–6)
FLUAV AG NPH QL: SIGNIFICANT CHANGE UP
FLUBV AG NPH QL: SIGNIFICANT CHANGE UP
GLUCOSE SERPL-MCNC: 98 MG/DL — SIGNIFICANT CHANGE UP (ref 70–99)
HCT VFR BLD CALC: 41.6 % — SIGNIFICANT CHANGE UP (ref 39–50)
HGB BLD-MCNC: 13.7 G/DL — SIGNIFICANT CHANGE UP (ref 13–17)
IMM GRANULOCYTES NFR BLD AUTO: 0.1 % — SIGNIFICANT CHANGE UP (ref 0–0.9)
LYMPHOCYTES # BLD AUTO: 2.85 K/UL — SIGNIFICANT CHANGE UP (ref 1–3.3)
LYMPHOCYTES # BLD AUTO: 42.2 % — SIGNIFICANT CHANGE UP (ref 13–44)
MAGNESIUM SERPL-MCNC: 2.2 MG/DL — SIGNIFICANT CHANGE UP (ref 1.6–2.6)
MCHC RBC-ENTMCNC: 30.9 PG — SIGNIFICANT CHANGE UP (ref 27–34)
MCHC RBC-ENTMCNC: 32.9 GM/DL — SIGNIFICANT CHANGE UP (ref 32–36)
MCV RBC AUTO: 93.7 FL — SIGNIFICANT CHANGE UP (ref 80–100)
MONOCYTES # BLD AUTO: 0.64 K/UL — SIGNIFICANT CHANGE UP (ref 0–0.9)
MONOCYTES NFR BLD AUTO: 9.5 % — SIGNIFICANT CHANGE UP (ref 2–14)
NEUTROPHILS # BLD AUTO: 3.05 K/UL — SIGNIFICANT CHANGE UP (ref 1.8–7.4)
NEUTROPHILS NFR BLD AUTO: 45.3 % — SIGNIFICANT CHANGE UP (ref 43–77)
PLATELET # BLD AUTO: 203 K/UL — SIGNIFICANT CHANGE UP (ref 150–400)
POTASSIUM SERPL-MCNC: 4.4 MMOL/L — SIGNIFICANT CHANGE UP (ref 3.5–5.3)
POTASSIUM SERPL-SCNC: 4.4 MMOL/L — SIGNIFICANT CHANGE UP (ref 3.5–5.3)
PROT SERPL-MCNC: 7.2 G/DL — SIGNIFICANT CHANGE UP (ref 6.6–8.7)
RBC # BLD: 4.44 M/UL — SIGNIFICANT CHANGE UP (ref 4.2–5.8)
RBC # FLD: 13.3 % — SIGNIFICANT CHANGE UP (ref 10.3–14.5)
RSV RNA NPH QL NAA+NON-PROBE: SIGNIFICANT CHANGE UP
SARS-COV-2 RNA SPEC QL NAA+PROBE: SIGNIFICANT CHANGE UP
SODIUM SERPL-SCNC: 143 MMOL/L — SIGNIFICANT CHANGE UP (ref 135–145)
T4 AB SER-ACNC: 7.2 UG/DL — SIGNIFICANT CHANGE UP (ref 4.5–12)
TROPONIN T SERPL-MCNC: <0.01 NG/ML — SIGNIFICANT CHANGE UP (ref 0–0.06)
TSH SERPL-MCNC: 2.64 UIU/ML — SIGNIFICANT CHANGE UP (ref 0.27–4.2)
WBC # BLD: 6.75 K/UL — SIGNIFICANT CHANGE UP (ref 3.8–10.5)
WBC # FLD AUTO: 6.75 K/UL — SIGNIFICANT CHANGE UP (ref 3.8–10.5)

## 2023-01-29 PROCEDURE — 99291 CRITICAL CARE FIRST HOUR: CPT

## 2023-01-29 PROCEDURE — 93010 ELECTROCARDIOGRAM REPORT: CPT

## 2023-01-29 PROCEDURE — 71045 X-RAY EXAM CHEST 1 VIEW: CPT | Mod: 26

## 2023-01-29 RX ORDER — ATORVASTATIN CALCIUM 80 MG/1
10 TABLET, FILM COATED ORAL AT BEDTIME
Refills: 0 | Status: DISCONTINUED | OUTPATIENT
Start: 2023-01-29 | End: 2023-02-06

## 2023-01-29 RX ORDER — AMLODIPINE BESYLATE 2.5 MG/1
5 TABLET ORAL DAILY
Refills: 0 | Status: DISCONTINUED | OUTPATIENT
Start: 2023-01-29 | End: 2023-01-29

## 2023-01-29 RX ORDER — METOPROLOL TARTRATE 50 MG
25 TABLET ORAL ONCE
Refills: 0 | Status: COMPLETED | OUTPATIENT
Start: 2023-01-29 | End: 2023-01-29

## 2023-01-29 RX ORDER — METOPROLOL TARTRATE 50 MG
5 TABLET ORAL ONCE
Refills: 0 | Status: COMPLETED | OUTPATIENT
Start: 2023-01-29 | End: 2023-01-29

## 2023-01-29 RX ORDER — LISINOPRIL 2.5 MG/1
20 TABLET ORAL DAILY
Refills: 0 | Status: DISCONTINUED | OUTPATIENT
Start: 2023-01-29 | End: 2023-02-06

## 2023-01-29 RX ORDER — SODIUM CHLORIDE 9 MG/ML
1000 INJECTION INTRAMUSCULAR; INTRAVENOUS; SUBCUTANEOUS ONCE
Refills: 0 | Status: COMPLETED | OUTPATIENT
Start: 2023-01-29 | End: 2023-01-29

## 2023-01-29 RX ORDER — ASPIRIN/CALCIUM CARB/MAGNESIUM 324 MG
324 TABLET ORAL ONCE
Refills: 0 | Status: COMPLETED | OUTPATIENT
Start: 2023-01-29 | End: 2023-01-29

## 2023-01-29 RX ADMIN — SODIUM CHLORIDE 1000 MILLILITER(S): 9 INJECTION INTRAMUSCULAR; INTRAVENOUS; SUBCUTANEOUS at 21:35

## 2023-01-29 RX ADMIN — Medication 5 MILLIGRAM(S): at 20:26

## 2023-01-29 RX ADMIN — Medication 5 MILLIGRAM(S): at 20:44

## 2023-01-29 RX ADMIN — Medication 5 MILLIGRAM(S): at 20:32

## 2023-01-29 RX ADMIN — SODIUM CHLORIDE 1000 MILLILITER(S): 9 INJECTION INTRAMUSCULAR; INTRAVENOUS; SUBCUTANEOUS at 20:35

## 2023-01-29 RX ADMIN — Medication 25 MILLIGRAM(S): at 21:07

## 2023-01-29 NOTE — CONSULT NOTE ADULT - ASSESSMENT
This is 63 y/o male with hx of pAfib (on ASA only), HTN, HLD who presents with chest pain. Pt states that he started feeling non-radiating chest pressure and SOB at 4pm. His wife checked his HR with Apple watch and found it to be in 170's. In the ED pt was found to have Afib with RVR on telemetry and EKG. Troponin negative x1, TSH normal. Pt states that his symptoms were infrequent and short-lasting but he noticed that lately he's been getting them more often. Pt follows with Dr. Mae in office. He had a recent cardiac CTA showing coronary calcium score 7 and wore a Holter monitor for 2 weeks. Pt has a family hx of Afib (father). Pt denies caffeine or alcohol use.

## 2023-01-29 NOTE — ED PROVIDER NOTE - NS ED ROS FT
Constitutional: no fever, no chills  Head: NC, AT  Eyes: no redness  ENMT: no nasal congestion/drainage, no sore throat  CV: + chest pain, no edema  Resp: no cough, + dyspnea  GI: no abdominal pain, no nausea, no vomiting, no diarrhea  : no dysuria, no hematuria  Skin: no lesions, no rashes  Neuro: no LOC, no headache, no sensory deficits, no weakness

## 2023-01-29 NOTE — CONSULT NOTE ADULT - SUBJECTIVE AND OBJECTIVE BOX
[facility]                                              CARDIOLOGY AT [address], [place] [address]                                             Telephone: [phone]. Fax:[phone]                                                       CARDIOLOGY CONSULTATION NOTE                                                                                             History obtained by: Patient and medical record  Community Cardiologist: Dr. Mae   obtained: Yes [  ] No [ x ]  Reason for Consultation: AFib with RVR  Available out pt records reviewed: Yes [ x ] No [  ]    Chief complaint:  "I felt chest pressure"    HPI:  This is 63 y/o male with hx of pAfib (on ASA only), HTN, HLD who presents with chest pain. Pt states that he started feeling non-radiating chest pressure and SOB at 4pm. His wife checked his HR with Apple watch and found it to be in 170's. In the ED pt was found to have Afib with RVR on telemetry and EKG. Troponin negative x1, TSH normal. Pt states that his symptoms were infrequent and short-lasting but he noticed that lately he's been getting them more often. Pt follows with Dr. Mae in office. He had a recent cardiac CTA showing coronary calcium score 7 and wore a Holter monitor for 2 weeks. Pt has a family hx of Afib (father). Pt denies caffeine or alcohol use.    CARDIAC TESTING   ECHO: n/a    STRESS: n/a    CATH: n/a    ELECTROPHYSIOLOGY: n/a    PAST MEDICAL HISTORY  Hypertension    Gout    High cholesterol    pAfib        PAST SURGICAL HISTORY  H/O hand surgery        SOCIAL HISTORY: lives with wife, works as   CIGARETTES:   never smoked  ALCOHOL: denies  DRUGS: denies    FAMILY HISTORY:  FHx: diabetes mellitus (Father)      Family History of Cardiovascular Disease:  Yes [  ] No [  ]  Coronary Artery Disease in first degree relative: Yes [  ] No [  ]  Sudden Cardiac Death in First degree relative: Yes [  ] No [  ]    HOME MEDICATIONS:  allopurinol 100 mg oral tablet: orally once a day (04 May 2021 06:17)  atorvastatin 10 mg oral tablet: 1 tab(s) orally once a day (04 May 2021 06:17)  Lisinopril 20 mg  Lipitor 10 mg  Omeprazole 40 mg  ASA 81 mg      CURRENT CARDIAC MEDICATIONS:  lisinopril 20 milliGRAM(s) Oral daily      CURRENT OTHER MEDICATIONS:  aspirin  chewable 324 milliGRAM(s) Oral once, Stop order after: 1 Doses  atorvastatin 10 milliGRAM(s) Oral at bedtime      ALLERGIES:   No Known Allergies      REVIEW OF SYMPTOMS:   CONSTITUTIONAL: No fever, no chills, no weight loss, no weight gain, no fatigue   ENMT:  No vertigo; No sinus or throat pain  NECK: No pain or stiffness  CARDIOVASCULAR: as per HPI  RESPIRATORY: no Shortness of breath, no cough, no wheezing  : No dysuria, no hematuria   GI: No dark color stool, no nausea, no diarrhea, no constipation, no abdominal pain   NEURO: No headache, no slurred speech   MUSCULOSKELETAL: No joint pain or swelling; No muscle, back, or extremity pain  PSYCH: No agitation, no anxiety.    ALL OTHER REVIEW OF SYSTEMS ARE NEGATIVE.    VITAL SIGNS:  T(C): --  T(F): --  HR: 59 (01-29-23 @ 21:25) (55 - 141)  BP: 151/95 (01-29-23 @ 21:25) (125/79 - 155/66)  RR: 18 (01-29-23 @ 21:25) (18 - 18)  SpO2: 100% (01-29-23 @ 21:25) (99% - 100%)    INTAKE AND OUTPUT:       PHYSICAL EXAM:  Constitutional: Comfortable . No acute distress.   HEENT: Atraumatic and normocephalic , neck is supple . no JVD. No carotid bruit.  CNS: A&Ox3. No focal deficits.   Respiratory: CTAB, unlabored   Cardiovascular: RRR normal s1 s2. No murmurs  Gastrointestinal: Soft, non-tender. +Bowel sounds.   Extremities: 2+ Peripheral Pulses, No clubbing, cyanosis, or edema  Psychiatric: Calm . no agitation.   Skin: Warm and dry, no ulcers on extremities     LABS:  ( 29 Jan 2023 20:24 )  Troponin T  <0.01,  CPK  X    , CKMB  X    , BNP X                                  13.7   6.75  )-----------( 203      ( 29 Jan 2023 20:24 )             41.6     01-29    143  |  110<H>  |  15.5  ----------------------------<  98  4.4   |  24.0  |  1.12    Ca    8.8      29 Jan 2023 20:24  Mg     2.2     01-29    TPro  7.2  /  Alb  4.1  /  TBili  <0.2<L>  /  DBili  x   /  AST  33  /  ALT  34  /  AlkPhos  94  01-29      Thyroid Stimulating Hormone, Serum: 2.64 uIU/mL (01-29-23 @ 20:24)      INTERPRETATION OF TELEMETRY: Afib with RVR, NSR     ECG: AFib with  bpm with occasional PVCs  Prior ECG: Yes [ x ] No [  ]    RADIOLOGY & ADDITIONAL STUDIES: n/a   X-ray:    CT scan:   MRI:   US:

## 2023-01-29 NOTE — ED CDU PROVIDER INITIAL DAY NOTE - WR ORDER NAME 1
Please tell Ms. Norton that I think her pain is cue to the non-obstructing kidney stone. She has a prescription for tamulosin, which she should take to help facilitate passing the stone.        Xray Chest 1 View- PORTABLE-Urgent

## 2023-01-29 NOTE — ED PROVIDER NOTE - OBJECTIVE STATEMENT
61 y/o M with PMHx HTN, HLD, gout, who presents to the ED for chest pain and palpitations starting this evening. States that he was watching the football game today when his symptoms started. States that this has happened to him before and that he wore a Holter monitor for a month and was told he has an arrythmia by his Cardiologist Dr. Mae and was given daily aspirin to take at home. States that he went to urgent care today and was then sent here. Upon arrival here, patient noted to be in AFb with RVR to rate of 140s/150s. Endorses some mid-sternal chest pressure and palpitations along with shortness of breath. Denies any recent illnesses, fevers, chills, NVD, abdominal pain, or dysuria.

## 2023-01-29 NOTE — ED ADULT NURSE NOTE - OBJECTIVE STATEMENT
Pt received in CC c/o non-radiating chest pressure presenting on CM as rapid aifb. MD Parker at bedside. Pt states pressure began approx 1600 as intermittent and over few hours became more constant with increased pressure with associated SOB. Pt describes SOB as "unable to catch breathe". Pt states has new hx of afib x 1 month, denies any anticoagulants. Hx of being on cardiac holster monitor x 2 months ago. Pt placed on CM in rapid afib with  @ 99% RA. Respirations even & unlabored. NAD. IV access maintained. Meds to be given as per MD order. Pt made aware of plan of care and verbalized understanding.

## 2023-01-29 NOTE — ED PROVIDER NOTE - PROGRESS NOTE DETAILS
Given 5 mg IV Lopressor x3 with resolution of AFib to NSR at 58 bpm. Patient reports feeling much improved. Trop negative. Cards consult placed. - Keith AJM: pt seen by [facility] cards who reccs cdu, echo, ep eval in AM. asa for now. no other ac

## 2023-01-29 NOTE — ED PROVIDER NOTE - CLINICAL SUMMARY MEDICAL DECISION MAKING FREE TEXT BOX
61 y/o M with PMHx HTN, HLD, gout, who presents to the ED for chest pain and palpitations starting this evening. Rate control, basic labs, trop, EKG, CXR, Cards cs, reassess.

## 2023-01-29 NOTE — ED PROVIDER NOTE - ATTENDING CONTRIBUTION TO CARE
63 y/o M with PMHx HTN, HLD, gout, who presents to the ED for chest pain and palpitations starting this evening. States that he was watching the football game today when his symptoms started. States that this has happened to him before and that he wore a Holter monitor for a month and was told he has an arrythmia by his Cardiologist Dr. Mae and was given daily aspirin to take at home. States that he went to urgent care today and was then sent here. Upon arrival here, patient noted to be in AFb with RVR to rate of 140s/150s. Endorses some mid-sternal chest pressure and palpitations along with shortness of breath. Denies any recent illnesses, fevers, chills, NVD, abdominal pain, or dysuria.    in ED pt with afib rvr. able to rate control seen by cards who requests cdu for echo and observation.

## 2023-01-29 NOTE — ED ADULT TRIAGE NOTE - CHIEF COMPLAINT QUOTE
Ambulatory reporting chest pain that started this evening. Patient went to  and was told to come to ED. Found to  be in rapid afib after EKG completed, patient brought right to CC, CN aware.

## 2023-01-29 NOTE — CONSULT NOTE ADULT - PROBLEM SELECTOR RECOMMENDATION 9
-telemetry overnight  -converted to SR after Lopressor 5 mg IV x3  -unable to start AV blockers due to baseline bradycardia  -CHADSVASC score1, continue ASA 81 mg daily  -pt symptomatic with more frequent episodes, will obtain EP consult for management and possible ablation

## 2023-01-29 NOTE — ED PROVIDER NOTE - PHYSICAL EXAMINATION
General: NAD  Head: NC, AT  EENT: no scleral icterus  Cardiac: tachycardic, irregular rate, no apparent murmurs, no lower extremity edema  Respiratory: CTABL, no respiratory distress  Abdomen: soft, ND, NT, nonperitonitic  MSK/Vascular: soft compartments, warm extremities  Neuro: sensation to light touch intact  Psych: calm, cooperative

## 2023-01-30 VITALS
RESPIRATION RATE: 18 BRPM | DIASTOLIC BLOOD PRESSURE: 63 MMHG | TEMPERATURE: 98 F | HEART RATE: 58 BPM | SYSTOLIC BLOOD PRESSURE: 126 MMHG | OXYGEN SATURATION: 98 %

## 2023-01-30 DIAGNOSIS — I10 ESSENTIAL (PRIMARY) HYPERTENSION: ICD-10-CM

## 2023-01-30 LAB
NT-PROBNP SERPL-SCNC: 105 PG/ML — SIGNIFICANT CHANGE UP (ref 0–300)
TROPONIN T SERPL-MCNC: <0.01 NG/ML — SIGNIFICANT CHANGE UP (ref 0–0.06)
TROPONIN T SERPL-MCNC: <0.01 NG/ML — SIGNIFICANT CHANGE UP (ref 0–0.06)

## 2023-01-30 PROCEDURE — G0378: CPT

## 2023-01-30 PROCEDURE — 83880 ASSAY OF NATRIURETIC PEPTIDE: CPT

## 2023-01-30 PROCEDURE — 84436 ASSAY OF TOTAL THYROXINE: CPT

## 2023-01-30 PROCEDURE — 83735 ASSAY OF MAGNESIUM: CPT

## 2023-01-30 PROCEDURE — 99238 HOSP IP/OBS DSCHRG MGMT 30/<: CPT

## 2023-01-30 PROCEDURE — 93005 ELECTROCARDIOGRAM TRACING: CPT

## 2023-01-30 PROCEDURE — 85025 COMPLETE CBC W/AUTO DIFF WBC: CPT

## 2023-01-30 PROCEDURE — 93010 ELECTROCARDIOGRAM REPORT: CPT | Mod: 76,59

## 2023-01-30 PROCEDURE — 96374 THER/PROPH/DIAG INJ IV PUSH: CPT | Mod: XU

## 2023-01-30 PROCEDURE — 84484 ASSAY OF TROPONIN QUANT: CPT

## 2023-01-30 PROCEDURE — 99291 CRITICAL CARE FIRST HOUR: CPT

## 2023-01-30 PROCEDURE — 71045 X-RAY EXAM CHEST 1 VIEW: CPT

## 2023-01-30 PROCEDURE — 87637 SARSCOV2&INF A&B&RSV AMP PRB: CPT

## 2023-01-30 PROCEDURE — 96361 HYDRATE IV INFUSION ADD-ON: CPT

## 2023-01-30 PROCEDURE — C8929: CPT

## 2023-01-30 PROCEDURE — 93306 TTE W/DOPPLER COMPLETE: CPT | Mod: 26

## 2023-01-30 PROCEDURE — 80053 COMPREHEN METABOLIC PANEL: CPT

## 2023-01-30 PROCEDURE — 36415 COLL VENOUS BLD VENIPUNCTURE: CPT

## 2023-01-30 PROCEDURE — 84443 ASSAY THYROID STIM HORMONE: CPT

## 2023-01-30 RX ORDER — FLECAINIDE ACETATE 50 MG
100 TABLET ORAL EVERY 12 HOURS
Refills: 0 | Status: DISCONTINUED | OUTPATIENT
Start: 2023-01-30 | End: 2023-02-06

## 2023-01-30 RX ORDER — METOPROLOL TARTRATE 50 MG
12.5 TABLET ORAL EVERY 12 HOURS
Refills: 0 | Status: DISCONTINUED | OUTPATIENT
Start: 2023-01-30 | End: 2023-02-06

## 2023-01-30 RX ORDER — FLECAINIDE ACETATE 50 MG
1 TABLET ORAL
Qty: 60 | Refills: 0
Start: 2023-01-30 | End: 2023-02-28

## 2023-01-30 RX ORDER — METOPROLOL TARTRATE 50 MG
0.5 TABLET ORAL
Qty: 30 | Refills: 0
Start: 2023-01-30 | End: 2023-02-28

## 2023-01-30 RX ADMIN — ATORVASTATIN CALCIUM 10 MILLIGRAM(S): 80 TABLET, FILM COATED ORAL at 00:46

## 2023-01-30 RX ADMIN — Medication 324 MILLIGRAM(S): at 00:46

## 2023-01-30 RX ADMIN — LISINOPRIL 20 MILLIGRAM(S): 2.5 TABLET ORAL at 06:37

## 2023-01-30 NOTE — CONSULT NOTE ADULT - NS ATTEND AMEND GEN_ALL_CORE FT
62 year old male with symptomatic paroxysmal atrial fibrillation. Will start on flecainide and metoprolol as above with plans for outpatient ablation.

## 2023-01-30 NOTE — ED CDU PROVIDER DISPOSITION NOTE - CLINICAL COURSE
62 year old male patient with a history of gout, paroxysmal atrial fibrillation (on ASA only), HTN, and HLD presenting with chest pressure and AFib RVR. Pt converted to sinus after IV metoprolol in ED. Trop neg x3, . CHADSVASC score1, continue ASA 81 mg daily. Echo with EF 60-65%. Pt seen by cardiology and electrophysiology who recommend outpt ablation. Started on Flecainide 100mg PO BID and Lopressor 12.5mg PO BID. Stable for discharge.

## 2023-01-30 NOTE — ED CDU PROVIDER SUBSEQUENT DAY NOTE - WET READ LAUNCH FT
ADA HUITRON outreached patient ([phone]) to discuss benefits verification form for Vivitrol  Patient did not answer  ADA HUITRON left message asking patient to return call  There is 1 Wet Read(s) to document.

## 2023-01-30 NOTE — ED ADULT NURSE REASSESSMENT NOTE - NS ED NURSE REASSESS COMMENT FT1
pt sitting calm in bed. a and o x3. breathing even and unlabored. sinus brian on cm. on observation and awaiting ep consult and additional testing. will continue to monitor. received report from Tiff strauss and assumed care of pt. pt sitting calm in bed. a and o x3. breathing even and unlabored. sinus brian on cm. on observation and awaiting ep consult and additional testing. will continue to monitor.

## 2023-01-30 NOTE — ED ADULT NURSE REASSESSMENT NOTE - NS ED NURSE REASSESS COMMENT FT1
Assumed care of patient at approx 0046. Patient AxOx4. Patient denies pain/discomfort, denies CP/SOB. Patient has been updated on the plan of care. Patient offers no complaints at this time. No visible signs of acute distress noted, safety maintained. Continuous cardiac monitoring & continous o2 monitoring continued as per orders. Safety maintained at all times.

## 2023-01-30 NOTE — CONSULT NOTE ADULT - SUBJECTIVE AND OBJECTIVE BOX
62 year old male patient with a history of paroxysmal atrial fibrillation (on ASA only), HTN, HLD who is admitted with chest pain. Pt states that he started feeling non-radiating chest pressure and SOB at 4pm. His wife checked his HR with Apple watch and found it to be in 170's. In the ED pt was found to have Afib with RVR on telemetry and EKG. Troponin negative x1, TSH normal. Pt states that his symptoms were infrequent and short-lasting but he noticed that lately he's been getting them more often. Pt follows with Dr. Mae in office. He had a recent cardiac CTA showing coronary calcium score 7 and wore a Holter monitor for 2 weeks. Pt has a family hx of Afib (father). Pt denies caffeine or alcohol use.      PAST MEDICAL & SURGICAL HISTORY:  Hypertension  Gout  High cholesterol  H/O hand surgery      REVIEW OF SYSTEMS  General:	  Skin/Breast:  Ophthalmologic:	  ENMT:	  Respiratory and Thorax:  Cardiovascular:	  Gastrointestinal:	  Genitourinary:	  Musculoskeletal:	  Neurological:	  Psychiatric:	  Hematology/Lymphatics:	  Endocrine:	  Allergic/Immunologic:	    MEDICATIONS  (STANDING):  atorvastatin 10 milliGRAM(s) Oral at bedtime  lisinopril 20 milliGRAM(s) Oral daily    MEDICATIONS  (PRN):    Allergies  No Known Allergies    SOCIAL HISTORY:    FAMILY HISTORY:  FHx: diabetes mellitus (Father)    Vital Signs Last 24 Hrs  T(C): 36.6 (2023 07:32), Max: 37.1 (2023 00:30)  T(F): 97.9 (2023 07:32), Max: 98.7 (2023 00:30)  HR: 52 (2023 07:32) (52 - 141)  BP: 155/75 (2023 07:32) (116/66 - 155/75)  BP(mean): 93 (2023 04:17) (93 - 118)  RR: 18 (2023 07:32) (17 - 18)  SpO2: 98% (2023 07:32) (98% - 100%)    Physical Exam:  Constitutional: AAOx3, NAD  Neck: supple, No JVD  Cardiovascular: +S1S2 RRR, no murmurs, rubs, gallops   Pulmonary: CTA b/l, unlabored, no wheezes, rales. No rhonchi  Abdomen: +BS, soft NTND  Extremities: no edema b/l, +distal pulses b/l  Neuro: non focal, speech clear, CAMACHO x 4    LABS:                        13.7   6.75  )-----------( 203      ( 2023 20:24 )             41.6     143  |  110<H>  |  15.5  ----------------------------<  98  4.4   |  24.0  |  1.12  Ca    8.8      2023 20:24  Mg     2.2       TPro  7.2  /  Alb  4.1  /  TBili  <0.2<L>  /  DBili  x   /  AST  33  /  ALT  34  /  AlkPhos  94    LIVER FUNCTIONS - ( 2023 20:24 )  Alb: 4.1 g/dL / Pro: 7.2 g/dL / ALK PHOS: 94 U/L / ALT: 34 U/L / AST: 33 U/L / GGT: x         CARDIAC MARKERS ( 2023 03:27 )  x     / <0.01 ng/mL / x     / x     / x      CARDIAC MARKERS ( 2023 20:24 )  x     / <0.01 ng/mL / x     / x     / x        RADIOLOGY & ADDITIONAL STUDIES:  CT angio 10/24/22  IMPRESSION:  Total coronary calcium score is 7 Agatston Units. Calcified plaque at the   origin of the LMCA causes no demonstrable luminal narrowing.  The major cardial coronary arteries are angiographically normal.    EK23  AFib at 121bpm; QRSD 94ms    A/P     62 year old male patient with a history of paroxysmal atrial fibrillation (on ASA only), HTN, HLD who is admitted with chest pain. Pt states that he started feeling non-radiating chest pressure and SOB at 4pm. His wife checked his HR with Apple watch and found it to be in 170's. In the ED pt was found to have Afib with RVR. Patient was first diagnosed with AFib in 2022 after noting an irregular heart beat on his iWatch. He wore a Holter Monitor for 5 days in November and was noted to have AFib with burden: 11%. He had a recent cardiac CTA showing coronary calcium score 7. He admits to a family hx of Afib (father). Pt denies caffeine or alcohol use.    PAST MEDICAL & SURGICAL HISTORY:  Hypertension  Gout  High cholesterol  H/O hand surgery      REVIEW OF SYSTEMS  General:	  Skin/Breast:  Ophthalmologic:	  ENMT:	  Respiratory and Thorax:  Cardiovascular:	  Gastrointestinal:	  Genitourinary:	  Musculoskeletal:	  Neurological:	  Psychiatric:	  Hematology/Lymphatics:	  Endocrine:	  Allergic/Immunologic:	    MEDICATIONS  (STANDING):  atorvastatin 10 milliGRAM(s) Oral at bedtime  lisinopril 20 milliGRAM(s) Oral daily    MEDICATIONS  (PRN):    Allergies  No Known Allergies    SOCIAL HISTORY:    FAMILY HISTORY:  FHx: diabetes mellitus (Father)    Vital Signs Last 24 Hrs  T(C): 36.6 (2023 07:32), Max: 37.1 (2023 00:30)  T(F): 97.9 (2023 07:32), Max: 98.7 (2023 00:30)  HR: 52 (2023 07:32) (52 - 141)  BP: 155/75 (2023 07:32) (116/66 - 155/75)  BP(mean): 93 (2023 04:17) (93 - 118)  RR: 18 (2023 07:32) (17 - 18)  SpO2: 98% (2023 07:32) (98% - 100%)    Physical Exam:  Constitutional: AAOx3, NAD  Neck: supple, No JVD  Cardiovascular: +S1S2 RRR, no murmurs, rubs, gallops   Pulmonary: CTA b/l, unlabored, no wheezes, rales. No rhonchi  Abdomen: +BS, soft NTND  Extremities: no edema b/l, +distal pulses b/l  Neuro: non focal, speech clear, CAMACHO x 4    LABS:                        13.7   6.75  )-----------( 203      ( 2023 20:24 )             41.6     143  |  110<H>  |  15.5  ----------------------------<  98  4.4   |  24.0  |  1.12  Ca    8.8      2023 20:24  Mg     2.2       TPro  7.2  /  Alb  4.1  /  TBili  <0.2<L>  /  DBili  x   /  AST  33  /  ALT  34  /  AlkPhos  94    LIVER FUNCTIONS - ( 2023 20:24 )  Alb: 4.1 g/dL / Pro: 7.2 g/dL / ALK PHOS: 94 U/L / ALT: 34 U/L / AST: 33 U/L / GGT: x         CARDIAC MARKERS ( 2023 03:27 )  x     / <0.01 ng/mL / x     / x     / x      CARDIAC MARKERS ( 2023 20:24 )  x     / <0.01 ng/mL / x     / x     / x        RADIOLOGY & ADDITIONAL STUDIES:  CT angio 10/24/22  IMPRESSION:  Total coronary calcium score is 7 Agatston Units. Calcified plaque at the   origin of the LMCA causes no demonstrable luminal narrowing.  The major cardial coronary arteries are angiographically normal.    EK23  AFib at 121bpm; QRSD 94ms    A/P     Very pleasant 62 year old male patient with a history of gout, paroxysmal atrial fibrillation (on ASA only), HTN, and HLD who is admitted with chest pain. Patient states that he started feeling non-radiating chest pressure and SOB at 4pm. His wife checked his HR with Apple watch and found it to be in 170's and he went to urgent care. While at Urgent Care center he underwent an EKG which demonstrated AFib. Decided to come to the ER this morning. In the ED pt was found to have Afib with RVR but converted to SR while admitted in B side. Notably the patient was first diagnosed with AFib in 2022 after noting an irregular heart beat on his iWatch. He wore a Holter Monitor for 5 days in November and was noted to have AFib with burden: 11%. He had a recent cardiac CTA showing coronary calcium score 7. He admits to a family hx of Afib (father). Pt denies caffeine or alcohol use. Planning to undergo sleep study.    PAST MEDICAL & SURGICAL HISTORY:  Hypertension  Gout  High cholesterol  H/O hand surgery      REVIEW OF SYSTEMS  General: - fever or chills, +fatigue  Skin/Breast: - rashes  Ophthalmologic: - blurred vision	  ENMT: - sore throat  Respiratory and Thorax: - cough  Cardiovascular: +chest pressure, - dyspnea  Gastrointestinal:	- N/V/D/C  Genitourinary: - dysuria  Musculoskeletal:	 - arthritis  Neurological: - weaknesses  Psychiatric: - anxiety  Hematology/Lymphatics: - bleeding disorders	  Endocrine: - heat or cold intolerance	    MEDICATIONS  (STANDING):  atorvastatin 10 milliGRAM(s) Oral at bedtime  lisinopril 20 milliGRAM(s) Oral daily    MEDICATIONS  (PRN):    Allergies  No Known Allergies    SOCIAL HISTORY: never smoker, non drinker, 1 cup coffee only occasionally     FAMILY HISTORY:  FHx: diabetes mellitus (Father)  Mother with multiple CVAs    Vital Signs Last 24 Hrs  T(C): 36.6 (2023 07:32), Max: 37.1 (2023 00:30)  T(F): 97.9 (2023 07:32), Max: 98.7 (2023 00:30)  HR: 52 (2023 07:32) (52 - 141)  BP: 155/75 (2023 07:32) (116/66 - 155/75)  BP(mean): 93 (2023 04:17) (93 - 118)  RR: 18 (2023 07:32) (17 - 18)  SpO2: 98% (2023 07:32) (98% - 100%)    Physical Exam:  Constitutional: AAOx3, NAD  Neck: supple, No JVD  Cardiovascular: +S1S2 RRR, no murmurs, rubs, gallops. SR at time of exam   Pulmonary: CTA b/l, unlabored, no wheezes, rales. No rhonchi  Abdomen: +BS, soft NTND  Extremities: no edema b/l,   Neuro: non focal, speech clear, CAMACHO x 4    LABS:                        13.7   6.75  )-----------( 203      ( 2023 20:24 )             41.6     143  |  110<H>  |  15.5  ----------------------------<  98  4.4   |  24.0  |  1.12  Ca    8.8      2023 20:24  Mg     2.2     -  TPro  7.2  /  Alb  4.1  /  TBili  <0.2<L>  /  DBili  x   /  AST  33  /  ALT  34  /  AlkPhos  94  -  LIVER FUNCTIONS - ( 2023 20:24 )  Alb: 4.1 g/dL / Pro: 7.2 g/dL / ALK PHOS: 94 U/L / ALT: 34 U/L / AST: 33 U/L / GGT: x         CARDIAC MARKERS ( 2023 03:27 )  x     / <0.01 ng/mL / x     / x     / x      CARDIAC MARKERS ( 2023 20:24 )  x     / <0.01 ng/mL / x     / x     / x        RADIOLOGY & ADDITIONAL STUDIES:  CT angio 10/24/22  IMPRESSION:  Total coronary calcium score is 7 Agatston Units. Calcified plaque at the   origin of the LMCA causes no demonstrable luminal narrowing.  The major cardial coronary arteries are angiographically normal.    EK23  AFib at 121bpm; QRSD 94ms    Telemetry: SR in the 50s     A/P  62 year old male patient with a history of gout, paroxysmal atrial fibrillation (on ASA only), HTN, and HLD who is admitted with chest pressure and recurrent Afib which converted to SR in ER    Remains in SR with rates int he 50s  TTE performed - awaiting results.  CHADSVASC: 1 (HTN)  Was going for outpatient sleep study pending insurance approval    - TSH normal   - CEx3 negative  - TTE results pending.   - Cardiac CT normal  - The patient is eager to undergo ablation of atrial fibrillation. He is very symptomatic when these events occur. Agreeable to see us in the office to formally go over the procedure. He is aware he will have to be on a blood thinner after the procedure and is agreeable. He reports no hx of bleeding or anemias  - Agree with outpatient polysomnography.   - Full recommendations to follow.    Very pleasant 62 year old male patient with a history of gout, paroxysmal atrial fibrillation (on ASA only), HTN, and HLD who is admitted with chest pain. Patient states that he started feeling non-radiating chest pressure and SOB at 4pm. His wife checked his HR with Apple watch and found it to be in 170's and he went to urgent care. While at Urgent Care center he underwent an EKG which demonstrated AFib. Decided to come to the ER this morning. In the ED pt was found to have Afib with RVR but converted to SR while admitted in B side. Notably the patient was first diagnosed with AFib in 2022 after noting an irregular heart beat on his iWatch. He wore a Holter Monitor for 5 days in November and was noted to have AFib with burden: 11%. He had a recent cardiac CTA showing coronary calcium score 7. He admits to a family hx of Afib (father). Pt denies caffeine or alcohol use. Planning to undergo sleep study.    PAST MEDICAL & SURGICAL HISTORY:  Hypertension  Gout  High cholesterol  H/O hand surgery      REVIEW OF SYSTEMS  General: - fever or chills, +fatigue  Skin/Breast: - rashes  Ophthalmologic: - blurred vision	  ENMT: - sore throat  Respiratory and Thorax: - cough  Cardiovascular: +chest pressure, - dyspnea  Gastrointestinal:	- N/V/D/C  Genitourinary: - dysuria  Musculoskeletal:	 - arthritis  Neurological: - weaknesses  Psychiatric: - anxiety  Hematology/Lymphatics: - bleeding disorders	  Endocrine: - heat or cold intolerance	    MEDICATIONS  (STANDING):  atorvastatin 10 milliGRAM(s) Oral at bedtime  lisinopril 20 milliGRAM(s) Oral daily    MEDICATIONS  (PRN):    Allergies  No Known Allergies    SOCIAL HISTORY: never smoker, non drinker, 1 cup coffee only occasionally     FAMILY HISTORY:  FHx: diabetes mellitus (Father)  Mother with multiple CVAs    Vital Signs Last 24 Hrs  T(C): 36.6 (2023 07:32), Max: 37.1 (2023 00:30)  T(F): 97.9 (2023 07:32), Max: 98.7 (2023 00:30)  HR: 52 (2023 07:32) (52 - 141)  BP: 155/75 (2023 07:32) (116/66 - 155/75)  BP(mean): 93 (2023 04:17) (93 - 118)  RR: 18 (2023 07:32) (17 - 18)  SpO2: 98% (2023 07:32) (98% - 100%)    Physical Exam:  Constitutional: AAOx3, NAD  Neck: supple, No JVD  Cardiovascular: +S1S2 RRR, no murmurs, rubs, gallops. SR at time of exam   Pulmonary: CTA b/l, unlabored, no wheezes, rales. No rhonchi  Abdomen: +BS, soft NTND  Extremities: no edema b/l,   Neuro: non focal, speech clear, CAMACHO x 4    LABS:                        13.7   6.75  )-----------( 203      ( 2023 20:24 )             41.6     143  |  110<H>  |  15.5  ----------------------------<  98  4.4   |  24.0  |  1.12  Ca    8.8      2023 20:24  Mg     2.2     -  TPro  7.2  /  Alb  4.1  /  TBili  <0.2<L>  /  DBili  x   /  AST  33  /  ALT  34  /  AlkPhos  94  -  LIVER FUNCTIONS - ( 2023 20:24 )  Alb: 4.1 g/dL / Pro: 7.2 g/dL / ALK PHOS: 94 U/L / ALT: 34 U/L / AST: 33 U/L / GGT: x         CARDIAC MARKERS ( 2023 03:27 )  x     / <0.01 ng/mL / x     / x     / x      CARDIAC MARKERS ( 2023 20:24 )  x     / <0.01 ng/mL / x     / x     / x        RADIOLOGY & ADDITIONAL STUDIES:  CT angio 10/24/22  IMPRESSION:  Total coronary calcium score is 7 Agatston Units. Calcified plaque at the   origin of the LMCA causes no demonstrable luminal narrowing.  The major cardial coronary arteries are angiographically normal.    EK23  AFib at 121bpm; QRSD 94ms    Telemetry: SR in the 50s     A/P  62 year old male patient with a history of gout, paroxysmal atrial fibrillation (on ASA only), HTN, and HLD who is admitted with chest pressure and recurrent Afib which converted to SR in ER    Remains in SR with rates int he 50s  TTE performed - awaiting results.  CHADSVASC: 1 (HTN)  Was going for outpatient sleep study pending insurance approval    - TSH normal   - CEx3 negative  - TTE results pending.   - Cardiac CT normal  - The patient is eager to undergo ablation of atrial fibrillation. He is very symptomatic when these events occur. Agreeable to see us in the office to formally go over the procedure. He is aware he will have to be on a blood thinner after the procedure and is agreeable. He reports no hx of bleeding or anemias  - Agree with outpatient polysomnography.   - Full recommendations to follow.     ***Addendum***  - Will add Flecainide 100mg PO BID and Lopressor 12.5mg PO BID. Will email office to arrange for outpatient follow up in the near future  - Discharge planning ok from EP perspective  - D/W Dr. Hammond

## 2023-01-30 NOTE — ED ADULT NURSE REASSESSMENT NOTE - NS ED NURSE REASSESS COMMENT FT1
Pt resting comfortably, denies HA, dizziness, SOB, CP, palpitations, N/V/D, chills. Pt AOx4, speaking coherently, respirations even and unlabored on RA, skin warm and dry, SB on CM. Pt placed in OBS awaiting TTE. Report given to B4L RN, pt transported to B4L.

## 2023-01-30 NOTE — ED CDU PROVIDER DISPOSITION NOTE - PATIENT PORTAL LINK FT
You can access the FollowMyHealth Patient Portal offered by [facility] by registering at the following website: [email or web address]. By joining [facility]’s FollowMyHealth portal, you will also be able to view your health information using other applications (apps) compatible with our system.

## 2023-01-30 NOTE — ED CDU PROVIDER DISPOSITION NOTE - NSFOLLOWUPINSTRUCTIONS_ED_ALL_ED_FT
Follow up with electrophysiologist.   Continue medications as prescribed.   Return for any new or worsening symptoms.

## 2023-01-30 NOTE — ED CDU PROVIDER SUBSEQUENT DAY NOTE - ATTENDING APP SHARED VISIT CONTRIBUTION OF CARE
I agree with the PA's note and was available for any issues/concerns. I was directly involved in patient care. My brief overall assessment is as follows:    no acute events overnight; awaiting cardiac assessment

## 2023-01-30 NOTE — ED CDU PROVIDER DISPOSITION NOTE - CARE PROVIDER_API CALL
Audie Hammond)  Cardiac Electrophysiology; Cardiovascular Disease; Internal Medicine  [address] [place] [address]  Phone: [phone]  Fax: [phone]  Follow Up Time:

## 2023-02-08 ENCOUNTER — NON-APPOINTMENT (OUTPATIENT)
Age: 63
End: 2023-02-08

## 2023-02-10 ENCOUNTER — OUTPATIENT (OUTPATIENT)
Dept: OUTPATIENT SERVICES | Facility: HOSPITAL | Age: 63
LOS: 1 days | End: 2023-02-10
Payer: COMMERCIAL

## 2023-02-10 DIAGNOSIS — G47.33 OBSTRUCTIVE SLEEP APNEA (ADULT) (PEDIATRIC): ICD-10-CM

## 2023-02-10 DIAGNOSIS — Z98.890 OTHER SPECIFIED POSTPROCEDURAL STATES: Chronic | ICD-10-CM

## 2023-02-10 PROCEDURE — 95800 SLP STDY UNATTENDED: CPT

## 2023-02-15 ENCOUNTER — APPOINTMENT (OUTPATIENT)
Dept: ELECTROPHYSIOLOGY | Facility: CLINIC | Age: 63
End: 2023-02-15
Payer: COMMERCIAL

## 2023-02-15 ENCOUNTER — NON-APPOINTMENT (OUTPATIENT)
Age: 63
End: 2023-02-15

## 2023-02-15 VITALS
HEIGHT: 71 IN | OXYGEN SATURATION: 96 % | HEART RATE: 46 BPM | BODY MASS INDEX: 28.28 KG/M2 | WEIGHT: 202 LBS | TEMPERATURE: 98.3 F | SYSTOLIC BLOOD PRESSURE: 102 MMHG | DIASTOLIC BLOOD PRESSURE: 60 MMHG

## 2023-02-15 PROCEDURE — 93000 ELECTROCARDIOGRAM COMPLETE: CPT

## 2023-02-15 PROCEDURE — 99214 OFFICE O/P EST MOD 30 MIN: CPT

## 2023-02-15 NOTE — HISTORY OF PRESENT ILLNESS
[FreeTextEntry1] : Mr. Kimbrough is a pleasant 62 year old male here for follow up today. The patient has a history of paroxysmal atrial fibrillation, HTN and gout. The patient was first diagnosed with atrial fibrillation in September of 2022. The patient had been experiencing some shortness of breath and noted his heart rate to be close to 200 bpm on his wife's Apple Watch. He was eventually diagnosed with atrial fibrillation and outpatient monitoring demonstrated paroxysmal AF with an 11% burden.He was again in the ER recently with atrial fibrillation with RVR. He self-converted to sinus rhythm and was started on flecainide and metoprolol with eventual plans for ablation. He is on aspirin (CHADSVASC score 1). He is here for follow up today. The patient states that he continues to have intermittent symptoms of palpitations and elevated HR consistent with atrial fibrillation.

## 2023-02-15 NOTE — DISCUSSION/SUMMARY
[EKG obtained to assist in diagnosis and management of assessed problem(s)] : EKG obtained to assist in diagnosis and management of assessed problem(s) [FreeTextEntry1] : In summary, Mr. Kimbrough is a pleasant 62 year old male with a history of HTN, gout and paroxysmal atrial fibrillation. He continues to have symptomatic atrial fibrillation despite being on flecainide. We discussed the various therapeutic options for the treatment of atrial fibrillation. Given failure of antiarrhythmic therapy I recommended catheter ablation. We discussed the procedure in detail including potential complications which include but are not limited to bleeding, hematoma, infection, damage to blood vessels, cardiac perforation, damage to the conduction system requiring pacemaker, MI, stroke and DVT. He expressed understanding and wishes to proceed. He is currently on aspirin. He will need to be on oral anticoagulation for at least 3 months after the procedure. \par

## 2023-02-24 ENCOUNTER — APPOINTMENT (OUTPATIENT)
Dept: PULMONOLOGY | Facility: CLINIC | Age: 63
End: 2023-02-24
Payer: COMMERCIAL

## 2023-02-24 VITALS
BODY MASS INDEX: 29.2 KG/M2 | WEIGHT: 204 LBS | SYSTOLIC BLOOD PRESSURE: 116 MMHG | RESPIRATION RATE: 16 BRPM | HEIGHT: 70 IN | DIASTOLIC BLOOD PRESSURE: 68 MMHG

## 2023-02-24 VITALS — OXYGEN SATURATION: 98 % | HEART RATE: 52 BPM

## 2023-02-24 DIAGNOSIS — R40.0 SOMNOLENCE: ICD-10-CM

## 2023-02-24 DIAGNOSIS — Z82.49 FAMILY HISTORY OF ISCHEMIC HEART DISEASE AND OTHER DISEASES OF THE CIRCULATORY SYSTEM: ICD-10-CM

## 2023-02-24 DIAGNOSIS — U07.1 COVID-19: ICD-10-CM

## 2023-02-24 DIAGNOSIS — Z82.3 FAMILY HISTORY OF STROKE: ICD-10-CM

## 2023-02-24 DIAGNOSIS — D64.9 ANEMIA, UNSPECIFIED: ICD-10-CM

## 2023-02-24 DIAGNOSIS — R06.83 SNORING: ICD-10-CM

## 2023-02-24 DIAGNOSIS — J12.82 COVID-19: ICD-10-CM

## 2023-02-24 PROCEDURE — 99204 OFFICE O/P NEW MOD 45 MIN: CPT

## 2023-02-24 NOTE — PHYSICAL EXAM
[General Appearance - In No Acute Distress] : no acute distress [Normal Oropharynx] : abnormal oropharynx [Low Lying Soft Palate] : low lying soft palate [Elongated Uvula] : elongated uvula [Enlarged Base of the Tongue] : enlargement of the base of the tongue [III] : III [Heart Sounds] : normal S1 and S2 [] : no respiratory distress [Respiration, Rhythm And Depth] : normal respiratory rhythm and effort [Exaggerated Use Of Accessory Muscles For Inspiration] : no accessory muscle use [Auscultation Breath Sounds / Voice Sounds] : lungs were clear to auscultation bilaterally [Skin Color & Pigmentation] : normal skin color and pigmentation [Oriented To Time, Place, And Person] : oriented to person, place, and time [Impaired Insight] : insight and judgment were intact [Affect] : the affect was normal

## 2023-02-24 NOTE — REVIEW OF SYSTEMS
[Negative] : Psychiatric [FreeTextEntry3] : per HPI [FreeTextEntry8] : per HPI [FreeTextEntry9] : per HPI [de-identified] : per HPI

## 2023-02-24 NOTE — HISTORY OF PRESENT ILLNESS
[Date: ___] : Date of most recent diagnostic polysomnogram: [unfilled] [AHI: ___ per hour] : Apnea-hypopnea index:  [unfilled] per hour [FreeTextEntry1] : Dx with Afib. Advised sleep eval. \par \par He snores, no known witnessed apneas, episodes of nonrestorative sleep.\par \par Schedule:\par To bed: 10p\par Latency: 20-45 min\par WASO: once\par OOB: 6a; w/o alarm.\par \par Works with machines. Polishing glass, using powder to polish.\par \par Has had SOB with exertion for the past 2-3 months. No wheeze. Occ sporadic cough. \par \par Had covid 4/2020. Told had covid pna. \par \par Hx afib. Plan on ablation. \par \par Lifelong nonsmoker. \par \par

## 2023-02-24 NOTE — ASSESSMENT
[FreeTextEntry1] : Mild to mod NICO with hypersomnolence, difficult to control afib; NICO should be treated. SOB; cxr clear, lungs clear on exam, no wheeze, lifelong nonsmoker; could be on basis of afib; will further evaluate for lung dz.

## 2023-02-24 NOTE — PROCEDURE
[FreeTextEntry1] : Review of sleep study\par \par \par \par ACC: [id number] EXAM: XR CHEST PORTABLE URGENT 1V ORDERED BY: YONNY DUNAWAY\par \par PROCEDURE DATE: 01/29/2023\par \par \par \par INTERPRETATION: Chest one view\par \par HISTORY: Chest pain\par \par COMPARISON STUDY: 10/24/2022\par \par Frontal expiratory view of the chest shows the heart to be normal in size. The lungs are clear and there is no evidence of pneumothorax nor pleural effusion.\par \par IMPRESSION:\par No active pulmonary disease.\par \par \par \par Thank you for the courtesy of this referral.\par \par --- End of Report ---\par \par \par \par \par \par POWER LUNDBERG MD; Attending Interventional Radiologist\par -------\par \par \par Summary:\par  1. Left ventricular ejection fraction, by visual estimation, is 60 to 65%.\par  2. Normal global left ventricular systolic function.\par  3. Mildly increased LV wall thickness.\par  4. Normal right ventricular size and function.\par  5. Mildly enlarged left atrium.\par  6. Trace mitral valve regurgitation.\par  7. Aortic root measured at Sinus of Valsalva is dilated. 3.7cm\par \par Chris Hagan MD Electronically signed on 1/30/2023 at 11:26:27 AM\par \par \par \par \par \par *** Final ***\par \par -------------\par EXAM: [id number] - CT ANGIO HEART CORONARY IC - ORDERED BY: BEAU SAMUELS\par \par \par PROCEDURE DATE: 10/24/2022\par \par \par \par INTERPRETATION: History: 62-year-old man. Essential hypertension. Hyperlipidemia. Abnormal EKG. Chest pain.\par \par Procedure: Informed consent was obtained from the patient and there were no complications during the procedure. ECG CT of the heart was initially performed without contrast administration. Afterwards, [id number] was injected intravenously for ECG-gated coronary CT angiography. 3-D images and curved multiplanar reformatted images were made available. The patient was pretreated with 5 mg of IV metoprolol and 0.8 mg of sublingual nitroglycerin.\par \par CONTRAST/COMPLICATIONS:\par IV Contrast: Omnipaque 350 72 cc administered 28 cc discarded\par Oral Contrast: NONE\par Complications: None reported at time of study completion\par \par FINDINGS:\par \par Coronary Calcium Score = 7 Agatston Units (all quantifiable calcium within the LMCA), which less than the 25th percentile for age and gender.\par \par LMCA-calcified plaque at the ostium of the LMCA causes no demonstrable luminal narrowing. The LMCA is angiographically normal.\par LAD-angiographically normal.\par Diagonal Branches-angiographically normal.\par LCx-angiographically normal\par Obtuse Marginal Branch-angiographically normal\par RCA- Angiographically normal.\par PDA-angiographically normal.\par \par There is a right dominant coronary arterial system.\par \par Non-coronary:\par \par Heart size normal. No pericardial effusion. Right lower lobe calcified granuloma. Visualized abdomen, soft tissues, and osseous structures unremarkable.\par \par \par IMPRESSION:\par \par Total coronary calcium score is 7 Agatston Units. Calcified plaque at the origin of the LMCA causes no demonstrable luminal narrowing.\par \par The major cardial coronary arteries are angiographically normal.\par \par --- End of Report ---\par \par \par \par \par \par \par  TONG TALAMANTES MD; Attending Radiologist

## 2023-03-13 ENCOUNTER — TRANSCRIPTION ENCOUNTER (OUTPATIENT)
Age: 63
End: 2023-03-13

## 2023-03-13 ENCOUNTER — INPATIENT (INPATIENT)
Facility: HOSPITAL | Age: 63
LOS: 0 days | Discharge: HOME | DRG: 274 | End: 2023-03-14
Attending: INTERNAL MEDICINE | Admitting: INTERNAL MEDICINE
Payer: COMMERCIAL

## 2023-03-13 VITALS
DIASTOLIC BLOOD PRESSURE: 81 MMHG | HEIGHT: 70 IN | WEIGHT: 201.94 LBS | OXYGEN SATURATION: 99 % | TEMPERATURE: 98 F | RESPIRATION RATE: 16 BRPM | SYSTOLIC BLOOD PRESSURE: 178 MMHG | HEART RATE: 48 BPM

## 2023-03-13 DIAGNOSIS — I48.0 PAROXYSMAL ATRIAL FIBRILLATION: ICD-10-CM

## 2023-03-13 DIAGNOSIS — Z98.890 OTHER SPECIFIED POSTPROCEDURAL STATES: Chronic | ICD-10-CM

## 2023-03-13 LAB
ANION GAP SERPL CALC-SCNC: 11 MMOL/L — SIGNIFICANT CHANGE UP (ref 5–17)
APTT BLD: 27.9 SEC — SIGNIFICANT CHANGE UP (ref 27.5–35.5)
BLD GP AB SCN SERPL QL: SIGNIFICANT CHANGE UP
BUN SERPL-MCNC: 19 MG/DL — SIGNIFICANT CHANGE UP (ref 8–20)
CALCIUM SERPL-MCNC: 8.7 MG/DL — SIGNIFICANT CHANGE UP (ref 8.4–10.5)
CHLORIDE SERPL-SCNC: 106 MMOL/L — SIGNIFICANT CHANGE UP (ref 96–108)
CO2 SERPL-SCNC: 26 MMOL/L — SIGNIFICANT CHANGE UP (ref 22–29)
CREAT SERPL-MCNC: 1.27 MG/DL — SIGNIFICANT CHANGE UP (ref 0.5–1.3)
EGFR: 64 ML/MIN/1.73M2 — SIGNIFICANT CHANGE UP
GLUCOSE SERPL-MCNC: 97 MG/DL — SIGNIFICANT CHANGE UP (ref 70–99)
HCT VFR BLD CALC: 42.3 % — SIGNIFICANT CHANGE UP (ref 39–50)
HGB BLD-MCNC: 13.9 G/DL — SIGNIFICANT CHANGE UP (ref 13–17)
INR BLD: 1.07 RATIO — SIGNIFICANT CHANGE UP (ref 0.88–1.16)
MAGNESIUM SERPL-MCNC: 2.2 MG/DL — SIGNIFICANT CHANGE UP (ref 1.6–2.6)
MCHC RBC-ENTMCNC: 31 PG — SIGNIFICANT CHANGE UP (ref 27–34)
MCHC RBC-ENTMCNC: 32.9 GM/DL — SIGNIFICANT CHANGE UP (ref 32–36)
MCV RBC AUTO: 94.4 FL — SIGNIFICANT CHANGE UP (ref 80–100)
PLATELET # BLD AUTO: 194 K/UL — SIGNIFICANT CHANGE UP (ref 150–400)
POTASSIUM SERPL-MCNC: 4.2 MMOL/L — SIGNIFICANT CHANGE UP (ref 3.5–5.3)
POTASSIUM SERPL-SCNC: 4.2 MMOL/L — SIGNIFICANT CHANGE UP (ref 3.5–5.3)
PROTHROM AB SERPL-ACNC: 12.4 SEC — SIGNIFICANT CHANGE UP (ref 10.5–13.4)
RBC # BLD: 4.48 M/UL — SIGNIFICANT CHANGE UP (ref 4.2–5.8)
RBC # FLD: 13.4 % — SIGNIFICANT CHANGE UP (ref 10.3–14.5)
SODIUM SERPL-SCNC: 142 MMOL/L — SIGNIFICANT CHANGE UP (ref 135–145)
WBC # BLD: 6.6 K/UL — SIGNIFICANT CHANGE UP (ref 3.8–10.5)
WBC # FLD AUTO: 6.6 K/UL — SIGNIFICANT CHANGE UP (ref 3.8–10.5)

## 2023-03-13 PROCEDURE — 93657 TX L/R ATRIAL FIB ADDL: CPT

## 2023-03-13 PROCEDURE — 93656 COMPRE EP EVAL ABLTJ ATR FIB: CPT

## 2023-03-13 PROCEDURE — 93010 ELECTROCARDIOGRAM REPORT: CPT | Mod: 76

## 2023-03-13 RX ORDER — ALPRAZOLAM 0.25 MG
0.25 TABLET ORAL EVERY 6 HOURS
Refills: 0 | Status: DISCONTINUED | OUTPATIENT
Start: 2023-03-13 | End: 2023-03-14

## 2023-03-13 RX ORDER — SUCRALFATE 1 G
1 TABLET ORAL
Refills: 0 | Status: DISCONTINUED | OUTPATIENT
Start: 2023-03-13 | End: 2023-03-14

## 2023-03-13 RX ORDER — BENZOCAINE AND MENTHOL 5; 1 G/100ML; G/100ML
1 LIQUID ORAL
Refills: 0 | Status: DISCONTINUED | OUTPATIENT
Start: 2023-03-13 | End: 2023-03-14

## 2023-03-13 RX ORDER — ONDANSETRON 8 MG/1
4 TABLET, FILM COATED ORAL EVERY 8 HOURS
Refills: 0 | Status: DISCONTINUED | OUTPATIENT
Start: 2023-03-13 | End: 2023-03-14

## 2023-03-13 RX ORDER — ACETAMINOPHEN 500 MG
650 TABLET ORAL EVERY 6 HOURS
Refills: 0 | Status: DISCONTINUED | OUTPATIENT
Start: 2023-03-13 | End: 2023-03-14

## 2023-03-13 RX ORDER — FLECAINIDE ACETATE 50 MG
100 TABLET ORAL
Refills: 0 | Status: DISCONTINUED | OUTPATIENT
Start: 2023-03-13 | End: 2023-03-14

## 2023-03-13 RX ORDER — APIXABAN 2.5 MG/1
1 TABLET, FILM COATED ORAL
Qty: 60 | Refills: 3
Start: 2023-03-13 | End: 2023-07-10

## 2023-03-13 RX ORDER — LISINOPRIL 2.5 MG/1
20 TABLET ORAL DAILY
Refills: 0 | Status: DISCONTINUED | OUTPATIENT
Start: 2023-03-14 | End: 2023-03-14

## 2023-03-13 RX ORDER — ALLOPURINOL 300 MG
100 TABLET ORAL DAILY
Refills: 0 | Status: DISCONTINUED | OUTPATIENT
Start: 2023-03-14 | End: 2023-03-14

## 2023-03-13 RX ORDER — AMLODIPINE BESYLATE 2.5 MG/1
5 TABLET ORAL DAILY
Refills: 0 | Status: DISCONTINUED | OUTPATIENT
Start: 2023-03-14 | End: 2023-03-14

## 2023-03-13 RX ORDER — PANTOPRAZOLE SODIUM 20 MG/1
40 TABLET, DELAYED RELEASE ORAL EVERY 12 HOURS
Refills: 0 | Status: DISCONTINUED | OUTPATIENT
Start: 2023-03-13 | End: 2023-03-14

## 2023-03-13 RX ORDER — LISINOPRIL 2.5 MG/1
1 TABLET ORAL
Qty: 0 | Refills: 0 | DISCHARGE

## 2023-03-13 RX ORDER — ASPIRIN/CALCIUM CARB/MAGNESIUM 324 MG
81 TABLET ORAL DAILY
Refills: 0 | Status: DISCONTINUED | OUTPATIENT
Start: 2023-03-14 | End: 2023-03-14

## 2023-03-13 RX ORDER — ASPIRIN/CALCIUM CARB/MAGNESIUM 324 MG
1 TABLET ORAL
Qty: 0 | Refills: 0 | DISCHARGE

## 2023-03-13 RX ORDER — METOPROLOL TARTRATE 50 MG
25 TABLET ORAL ONCE
Refills: 0 | Status: DISCONTINUED | OUTPATIENT
Start: 2023-03-14 | End: 2023-03-14

## 2023-03-13 RX ORDER — DABIGATRAN ETEXILATE MESYLATE 150 MG/1
1 CAPSULE ORAL
Qty: 60 | Refills: 1
Start: 2023-03-13 | End: 2023-05-11

## 2023-03-13 RX ORDER — DABIGATRAN ETEXILATE MESYLATE 150 MG/1
150 CAPSULE ORAL
Refills: 0 | Status: DISCONTINUED | OUTPATIENT
Start: 2023-03-13 | End: 2023-03-14

## 2023-03-13 RX ORDER — FUROSEMIDE 40 MG
20 TABLET ORAL DAILY
Refills: 0 | Status: DISCONTINUED | OUTPATIENT
Start: 2023-03-14 | End: 2023-03-14

## 2023-03-13 RX ORDER — ATORVASTATIN CALCIUM 80 MG/1
10 TABLET, FILM COATED ORAL AT BEDTIME
Refills: 0 | Status: DISCONTINUED | OUTPATIENT
Start: 2023-03-13 | End: 2023-03-14

## 2023-03-13 RX ORDER — FUROSEMIDE 40 MG
20 TABLET ORAL ONCE
Refills: 0 | Status: COMPLETED | OUTPATIENT
Start: 2023-03-13 | End: 2023-03-13

## 2023-03-13 RX ADMIN — DABIGATRAN ETEXILATE MESYLATE 150 MILLIGRAM(S): 150 CAPSULE ORAL at 16:02

## 2023-03-13 RX ADMIN — Medication 650 MILLIGRAM(S): at 23:38

## 2023-03-13 RX ADMIN — Medication 20 MILLIGRAM(S): at 15:59

## 2023-03-13 RX ADMIN — BENZOCAINE AND MENTHOL 1 LOZENGE: 5; 1 LIQUID ORAL at 21:44

## 2023-03-13 RX ADMIN — Medication 100 MILLIGRAM(S): at 17:55

## 2023-03-13 RX ADMIN — Medication 1 GRAM(S): at 17:54

## 2023-03-13 RX ADMIN — PANTOPRAZOLE SODIUM 40 MILLIGRAM(S): 20 TABLET, DELAYED RELEASE ORAL at 17:54

## 2023-03-13 RX ADMIN — ATORVASTATIN CALCIUM 10 MILLIGRAM(S): 80 TABLET, FILM COATED ORAL at 21:44

## 2023-03-13 NOTE — DISCHARGE NOTE PROVIDER - CARE PROVIDERS DIRECT ADDRESSES
[email or web address]

## 2023-03-13 NOTE — DISCHARGE NOTE PROVIDER - CARE PROVIDER_API CALL
Donnell Mae)  Cardiology; Internal Medicine  [address] [place] [address], [address] [place] [id number]  Phone: [phone]  Fax: [phone]  Follow Up Time:     Audie Hammond)  Cardiac Electrophysiology; Cardiovascular Disease; Internal Medicine  [address] [place] [address], [address] [place], [place] [id number]  Phone: [phone]  Fax: [phone]  Follow Up Time:

## 2023-03-13 NOTE — DISCHARGE NOTE PROVIDER - NSDCFUADDINST_GEN_ALL_CORE_FT
Our office will contact you in 3-5 days to schedule this appointment. Please call [phone] with questions or concerns.      Continue Pradaxa twice daily without any interruption.  Lasix 20mg daily x 2 more days.   Decrease Metoprolol (Toprol) to 25mg daily.   Protonix 40mg twice daily x 2 weeks, then decrease to once daily x 6 weeks.   Carafate twice daily x 2 weeks then stop.

## 2023-03-13 NOTE — H&P PST ADULT - HISTORY OF PRESENT ILLNESS
62 year old male with PMH of paroxysmal atrial fibrillation, HTN and gout. The patient was first diagnosed with atrial fibrillation in 2022.  He had been experiencing some shortness of breath and noted his heart rate to be close to 200 bpm on his wife's Apple Watch. He was eventually diagnosed with atrial fibrillation and outpatient monitoring demonstrated paroxysmal AF with an 11% burden.  He was again in the ER recently with atrial fibrillation with RVR. He self-converted to sinus rhythm and was started on flecainide and metoprolol with eventual plans for ablation. He now presents electively for AF ablation.     Cardiology Summary:  EC/15/23; sinus rhythm   TTE: 2023; LVEF 60 to 65%. Mildly increased LV wall thickness. Mildly enlarged left atrium. Trace mitral valve regurgitation.           62 year old male with PMH of paroxysmal atrial fibrillation, HTN, HLD, and gout. The patient was first diagnosed with atrial fibrillation in 2022.  He had been experiencing some shortness of breath and noted his heart rate to be close to 200 bpm on his wife's Apple Watch. He was eventually diagnosed with atrial fibrillation and outpatient monitoring demonstrated paroxysmal AF with an 11% burden.  He was again in the ER recently with atrial fibrillation with RVR. He self-converted to sinus rhythm and was started on flecainide and metoprolol with eventual plans for ablation. He now presents electively for AF ablation.     Cardiology Summary:  EC/15/23; sinus rhythm   TTE: 2023; LVEF 60 to 65%. Mildly increased LV wall thickness. Mildly enlarged left atrium. Trace mitral valve regurgitation.

## 2023-03-13 NOTE — H&P PST ADULT - NSICDXPASTMEDICALHX_GEN_ALL_CORE_FT
PAST MEDICAL HISTORY:  Gout     High cholesterol     Hypertension     Paroxysmal atrial fibrillation

## 2023-03-13 NOTE — H&P PST ADULT - ASSESSMENT
Plan:  Consent w/ Attending  Stat labs & ecg  Covid pcr ***  NPO ***  Last AC **** Plan:  Consent w/ Attending  Stat labs & ecg  Covid pcr 3/12/2023  NPO confirmed 62 year old male with PMH of paroxysmal atrial fibrillation, HTN, HLD, and gout. The patient was first diagnosed with atrial fibrillation in September of 2022.  He had been experiencing some shortness of breath and noted his heart rate to be close to 200 bpm on his wife's Apple Watch. He was eventually diagnosed with atrial fibrillation and outpatient monitoring demonstrated paroxysmal AF with an 11% burden.  He was again in the ER recently with atrial fibrillation with RVR. He self-converted to sinus rhythm and was started on flecainide and metoprolol with eventual plans for ablation. He now presents electively for AF ablation.     Pt currently in NSR, ALISA to be cleared with ICE.     Plan:  Consent w/ Attending  Stat labs & ecg  Covid pcr 3/12/2023  NPO confirmed

## 2023-03-13 NOTE — H&P PST ADULT - ATTENDING COMMENTS
Symptomatic paroxysmal atrial fibrillation for catheter ablation today. ICE to assess for ALISA thrombus. In sinus rhythm today.

## 2023-03-13 NOTE — DISCHARGE NOTE PROVIDER - NSDCMRMEDTOKEN_GEN_ALL_CORE_FT
allopurinol 100 mg oral tablet: orally once a day  amLODIPine 5 mg oral tablet: 1 tab(s) orally once a day  Aspir 81 oral delayed release tablet: 1 tab(s) orally once a day  atorvastatin 10 mg oral tablet: 1 tab(s) orally once a day  dabigatran 150 mg oral capsule: 1 cap(s) orally 2 times a day   flecainide 100 mg oral tablet: 1 tab(s) orally 2 times a day   lisinopril 20 mg oral tablet: 1 tab(s) orally once a day  Metoprolol Tartrate 50 mg oral tablet: 1 tab(s) orally 2 times a day  Norco 5 mg-325 mg oral tablet: 1 tab(s) orally every 4 hours MDD:6   allopurinol 100 mg oral tablet: orally once a day  amLODIPine 5 mg oral tablet: 1 tab(s) orally once a day  Aspir 81 oral delayed release tablet: 1 tab(s) orally once a day  atorvastatin 10 mg oral tablet: 1 tab(s) orally once a day  dabigatran 150 mg oral capsule: 1 cap(s) orally 2 times a day   flecainide 100 mg oral tablet: 1 tab(s) orally 2 times a day   furosemide 20 mg oral tablet: 1 tab(s) orally once a day x 2 days then stop  lisinopril 20 mg oral tablet: 1 tab(s) orally once a day  metoprolol succinate 25 mg oral capsule, extended release: 1 cap(s) orally once a day  Norco 5 mg-325 mg oral tablet: 1 tab(s) orally every 4 hours MDD:6  pantoprazole 40 mg oral delayed release tablet: 1 tab(s) orally every 12 hours x 14 days then decrease to once daily x 6 weeks then stop  sucralfate 1 g/10 mL oral suspension: 10 milliliter(s) orally 2 times a day x 14 days then stop

## 2023-03-13 NOTE — PROGRESS NOTE ADULT - SUBJECTIVE AND OBJECTIVE BOX
PROCEDURE(S): Radiofrequency Ablation of Atrial Fibrillation    ELECTROPHYSIOLOGIST(S): Audie Hammond MD         COMPLICATIONS:  none        DISPOSITION:  observation     CONDITION: stable  EBL: <15mL    Pt doing well s/p atrial fibrillation ablation ([facility]/PVI + CTI) via b/l [facility] access. Denies complaint.       MEDICATIONS  (STANDING):  atorvastatin 10 milliGRAM(s) Oral at bedtime  dabigatran 150 milliGRAM(s) Oral <User Schedule>  flecainide 100 milliGRAM(s) Oral two times a day  furosemide   Injectable 20 milliGRAM(s) IV Push once  pantoprazole    Tablet 40 milliGRAM(s) Oral every 12 hours  sucralfate suspension 1 Gram(s) Oral two times a day    MEDICATIONS  (PRN):  acetaminophen     Tablet .. 650 milliGRAM(s) Oral every 6 hours PRN Mild Pain (1 - 3)  ALPRAZolam 0.25 milliGRAM(s) Oral every 6 hours PRN anxiety/ insomnia  aluminum hydroxide/magnesium hydroxide/simethicone Suspension 30 milliLiter(s) Oral every 4 hours PRN Dyspepsia  benzocaine/menthol Lozenge 1 Lozenge Oral every 2 hours PRN Sore Throat  ondansetron Injectable 4 milliGRAM(s) IV Push every 8 hours PRN Nausea and/or Vomiting      Allergies  No Known Allergies      Exam:   HR:   BP:   RR:   SpO2:     Gen: VSS, NAD, A&O x 3  Card: S1/S2, RRR, no m/g/r  Resp: lungs CTA b/l  Abd: S/NT/ND  Groins: hemostatic sutures in place; sites C/D/I b/l; no bleeding, hematoma, erythema, exudate or edema  Ext: no edema; distal pulses intact  Neuro: CN II-XII grossly intact, CAMACHO x4 with strength and sensation intact and equal bilaterally    I/Os: net +     ECG:    Assessment:   62 year old male with PMH of paroxysmal atrial fibrillation, HTN, HLD, and gout. The patient was first diagnosed with atrial fibrillation in September of 2022.  He had been experiencing some shortness of breath and noted his heart rate to be close to 200 bpm on his wife's Apple Watch. He was eventually diagnosed with atrial fibrillation and outpatient monitoring demonstrated paroxysmal AF with an 11% burden.  He was again in the ER recently with atrial fibrillation with RVR. He self-converted to sinus rhythm and was started on flecainide and metoprolol with eventual plans for ablation. He presented electively on 3/13/2023 for and is now status post uncomplicated radiofrequency ablation of atrial fibrillation.      Plan:   Bedrest x 4 hours, then OOB with assistance and progress as tolerated.   Groin sutures to be removed by EP service in AM.   Radial art line to be removed once pt fully awake with stable vitals >1 hour post op.   Pending groin status: 150mg PO Pradaxa to start at 15:30 x 3 months.    DO NOT HOLD, INTERRUPT OR REVERSE ANTICOAGULATION WITHOUT EXPLICIT APPROVAL FROM EP SERVICE.   Lasix 20mg IV x 1 dose once ambulating, then Lasix 20mg PO daily x 3 days.   Decrease Metoprolol to 25mg daily.   Start Protonix 40mg twice daily x 2 weeks, then once daily x 6 weeks.   Start Carafate 1gm BID x 2 weeks.   Continue other home medications.   Strict I/Os.  Please encourage incentive spirometry and ambulation once able.  Observation and monitoring on telemetry overnight with anticipated discharge in the AM and outpt follow up in 2-4 weeks.  PROCEDURE(S): Radiofrequency Ablation of Atrial Fibrillation    ELECTROPHYSIOLOGIST(S): Audie Hammond MD         COMPLICATIONS:  none        DISPOSITION:  observation     CONDITION: stable  EBL: <15mL    Pt doing well s/p atrial fibrillation ablation ([facility]/PVI + CTI) via b/l [facility] access. Denies complaint.       MEDICATIONS  (STANDING):  atorvastatin 10 milliGRAM(s) Oral at bedtime  dabigatran 150 milliGRAM(s) Oral <User Schedule>  flecainide 100 milliGRAM(s) Oral two times a day  furosemide   Injectable 20 milliGRAM(s) IV Push once  pantoprazole    Tablet 40 milliGRAM(s) Oral every 12 hours  sucralfate suspension 1 Gram(s) Oral two times a day    MEDICATIONS  (PRN):  acetaminophen     Tablet .. 650 milliGRAM(s) Oral every 6 hours PRN Mild Pain (1 - 3)  ALPRAZolam 0.25 milliGRAM(s) Oral every 6 hours PRN anxiety/ insomnia  aluminum hydroxide/magnesium hydroxide/simethicone Suspension 30 milliLiter(s) Oral every 4 hours PRN Dyspepsia  benzocaine/menthol Lozenge 1 Lozenge Oral every 2 hours PRN Sore Throat  ondansetron Injectable 4 milliGRAM(s) IV Push every 8 hours PRN Nausea and/or Vomiting      Allergies  No Known Allergies      Exam:   HR: 78  BP: 135/65  RR: 14  SpO2: 94% RA    Gen: VSS, NAD, A&O x 3  Card: S1/S2, RRR, no m/g/r  Resp: lungs CTA b/l  Abd: S/NT/ND  Groins: hemostatic sutures in place; sites C/D/I b/l; no bleeding, hematoma, erythema, exudate or edema  Ext: no edema; distal pulses intact  Neuro: CN II-XII grossly intact, CAMACHO x4 with strength and sensation intact and equal bilaterally    I/Os: net +     ECG:    Assessment:   62 year old male with PMH of paroxysmal atrial fibrillation, HTN, HLD, and gout. The patient was first diagnosed with atrial fibrillation in September of 2022.  He had been experiencing some shortness of breath and noted his heart rate to be close to 200 bpm on his wife's Apple Watch. He was eventually diagnosed with atrial fibrillation and outpatient monitoring demonstrated paroxysmal AF with an 11% burden.  He was again in the ER recently with atrial fibrillation with RVR. He self-converted to sinus rhythm and was started on flecainide and metoprolol with eventual plans for ablation. He presented electively on 3/13/2023 for and is now status post uncomplicated radiofrequency ablation of atrial fibrillation.      Plan:   Bedrest x 4 hours, then OOB with assistance and progress as tolerated.   Groin sutures to be removed by EP service in AM.   Pending groin status: 150mg PO Pradaxa to start at 15:30 x 3 months.    DO NOT HOLD, INTERRUPT OR REVERSE ANTICOAGULATION WITHOUT EXPLICIT APPROVAL FROM EP SERVICE.   Lasix 20mg IV x 1 dose once ambulating, then Lasix 20mg PO daily x 3 days.   Decrease Metoprolol to 25mg daily.   Start Protonix 40mg twice daily x 2 weeks, then once daily x 6 weeks.   Start Carafate 1gm BID x 2 weeks.   Continue other home medications.   Strict I/Os.  Please encourage incentive spirometry and ambulation once able.  Observation and monitoring on telemetry overnight with anticipated discharge in the AM and outpt follow up in 2-4 weeks.  PROCEDURE(S): Radiofrequency Ablation of Atrial Fibrillation    ELECTROPHYSIOLOGIST(S): Audie Hammond MD         COMPLICATIONS:  none        DISPOSITION:  observation     CONDITION: stable  EBL: <15mL    Pt doing well s/p atrial fibrillation ablation ([facility]/PVI + CTI) via b/l [facility] access. Denies complaint.       MEDICATIONS  (STANDING):  atorvastatin 10 milliGRAM(s) Oral at bedtime  dabigatran 150 milliGRAM(s) Oral <User Schedule>  flecainide 100 milliGRAM(s) Oral two times a day  furosemide   Injectable 20 milliGRAM(s) IV Push once  pantoprazole    Tablet 40 milliGRAM(s) Oral every 12 hours  sucralfate suspension 1 Gram(s) Oral two times a day    MEDICATIONS  (PRN):  acetaminophen     Tablet .. 650 milliGRAM(s) Oral every 6 hours PRN Mild Pain (1 - 3)  ALPRAZolam 0.25 milliGRAM(s) Oral every 6 hours PRN anxiety/ insomnia  aluminum hydroxide/magnesium hydroxide/simethicone Suspension 30 milliLiter(s) Oral every 4 hours PRN Dyspepsia  benzocaine/menthol Lozenge 1 Lozenge Oral every 2 hours PRN Sore Throat  ondansetron Injectable 4 milliGRAM(s) IV Push every 8 hours PRN Nausea and/or Vomiting      Allergies  No Known Allergies      Exam:   HR: 78  BP: 135/65  RR: 14  SpO2: 94% RA    Gen: VSS, NAD, A&O x 3  Card: S1/S2, RRR, no m/g/r  Resp: lungs CTA b/l  Abd: S/NT/ND  Groins: hemostatic sutures in place; sites C/D/I b/l; no bleeding, hematoma, erythema, exudate or edema  Ext: no edema; distal pulses intact  Neuro: CN II-XII grossly intact, CAMACHO x4 with strength and sensation intact and equal bilaterally    I/Os: net + 1552    ECG: NSR at 73bpm. AK 214ms. QRSD 108ms.     Assessment:   62 year old male with PMH of paroxysmal atrial fibrillation, HTN, HLD, and gout. The patient was first diagnosed with atrial fibrillation in September of 2022.  He had been experiencing some shortness of breath and noted his heart rate to be close to 200 bpm on his wife's Apple Watch. He was eventually diagnosed with atrial fibrillation and outpatient monitoring demonstrated paroxysmal AF with an 11% burden.  He was again in the ER recently with atrial fibrillation with RVR. He self-converted to sinus rhythm and was started on flecainide and metoprolol with eventual plans for ablation. He presented electively on 3/13/2023 for and is now status post uncomplicated radiofrequency ablation of atrial fibrillation.      Plan:   Bedrest x 4 hours, then OOB with assistance and progress as tolerated.   Groin sutures to be removed by EP service in AM.   Pending groin status: 150mg PO Pradaxa to start at 15:30 x 3 months.    DO NOT HOLD, INTERRUPT OR REVERSE ANTICOAGULATION WITHOUT EXPLICIT APPROVAL FROM EP SERVICE.   Lasix 20mg IV x 1 dose once ambulating, then Lasix 20mg PO daily x 3 days.   Decrease Metoprolol to 25mg daily.   Start Protonix 40mg twice daily x 2 weeks, then once daily x 6 weeks.   Start Carafate 1gm BID x 2 weeks.   Continue other home medications.   Strict I/Os.  Please encourage incentive spirometry and ambulation once able.  Observation and monitoring on telemetry overnight with anticipated discharge in the AM and outpt follow up in 2-4 weeks.

## 2023-03-13 NOTE — DISCHARGE NOTE PROVIDER - NSDCFUADDAPPT_GEN_ALL_CORE_FT
Our office will contact you in 3-5 days to schedule this appointment. Please call [phone] with questions or concerns. Our office will contact you in 3-5 days to schedule this appointment. Please call [phone] with questions or concerns.      Continue Pradaxa twice daily without any interruption.  Lasix 20mg daily x 2 more days.   Decrease Metoprolol (Toprol) to 25mg daily.   Protonix 40mg twice daily x 2 weeks, then decrease to once daily x 6 weeks.   Carafate twice daily x 2 weeks then stop.

## 2023-03-13 NOTE — H&P PST ADULT - COMMENTS
Denies fevers, chills, CP, palp, SOB, dizziness, abdominal pain, N/V/D, hematuria, bloody or dark stools

## 2023-03-13 NOTE — ASU PATIENT PROFILE, ADULT - FALL HARM RISK - UNIVERSAL INTERVENTIONS
Bed in lowest position, wheels locked, appropriate side rails in place/Call bell, personal items and telephone in reach/Instruct patient to call for assistance before getting out of bed or chair/Non-slip footwear when patient is out of bed/[place] to call system/Physically safe environment - no spills, clutter or unnecessary equipment/Purposeful Proactive Rounding/Room/bathroom lighting operational, light cord in reach

## 2023-03-13 NOTE — DISCHARGE NOTE PROVIDER - NSDCFUSCHEDAPPT_GEN_ALL_CORE_FT
Shannon Weems  [facility]  CARDIOLOGY [address]  Scheduled Appointment: 03/15/2023    Gonzalo Martin  [facility]  [facility] [address]  Scheduled Appointment: 03/24/2023    [facility]  PULED [address]  Scheduled Appointment: 04/14/2023    Iggy Johns  [facility]  PULED [address]  Scheduled Appointment: 04/14/2023    Gonzalo Martin  [facility]  [facility] [address]  Scheduled Appointment: 04/19/2023

## 2023-03-13 NOTE — DISCHARGE NOTE PROVIDER - HOSPITAL COURSE
62 year old male with PMH of paroxysmal atrial fibrillation, HTN, HLD, and gout. The patient was first diagnosed with atrial fibrillation in September of 2022.  He had been experiencing some shortness of breath and noted his heart rate to be close to 200 bpm on his wife's Apple Watch. He was eventually diagnosed with atrial fibrillation and outpatient monitoring demonstrated paroxysmal AF with an 11% burden.  He was again in the ER recently with atrial fibrillation with RVR. He self-converted to sinus rhythm and was started on flecainide and metoprolol with eventual plans for ablation. He presented electively on 3/13/2023 for and is now POD #1 status post uncomplicated radiofrequency ablation of atrial fibrillation.      Plan:   Continue uninterrupted Pradaxa twice daily.  Lasix 20mg PO daily x 3 days.   Metoprolol 25mg daily.   Protonix 40mg twice daily x 2 weeks, then once daily x 6 weeks.   Carafate 1gm BID x 2 weeks.  Access site care and activity limitations reviewed w/ pt.   Outpt f/up in 2-4 weeks.

## 2023-03-14 ENCOUNTER — TRANSCRIPTION ENCOUNTER (OUTPATIENT)
Age: 63
End: 2023-03-14

## 2023-03-14 VITALS — DIASTOLIC BLOOD PRESSURE: 71 MMHG | SYSTOLIC BLOOD PRESSURE: 138 MMHG | RESPIRATION RATE: 16 BRPM | HEART RATE: 60 BPM

## 2023-03-14 LAB
ANION GAP SERPL CALC-SCNC: 15 MMOL/L — SIGNIFICANT CHANGE UP (ref 5–17)
BUN SERPL-MCNC: 21.3 MG/DL — HIGH (ref 8–20)
CALCIUM SERPL-MCNC: 9.2 MG/DL — SIGNIFICANT CHANGE UP (ref 8.4–10.5)
CHLORIDE SERPL-SCNC: 104 MMOL/L — SIGNIFICANT CHANGE UP (ref 96–108)
CO2 SERPL-SCNC: 24 MMOL/L — SIGNIFICANT CHANGE UP (ref 22–29)
CREAT SERPL-MCNC: 1.37 MG/DL — HIGH (ref 0.5–1.3)
EGFR: 59 ML/MIN/1.73M2 — LOW
GLUCOSE SERPL-MCNC: 99 MG/DL — SIGNIFICANT CHANGE UP (ref 70–99)
HCT VFR BLD CALC: 41 % — SIGNIFICANT CHANGE UP (ref 39–50)
HGB BLD-MCNC: 13.5 G/DL — SIGNIFICANT CHANGE UP (ref 13–17)
MAGNESIUM SERPL-MCNC: 2 MG/DL — SIGNIFICANT CHANGE UP (ref 1.8–2.6)
MCHC RBC-ENTMCNC: 30.9 PG — SIGNIFICANT CHANGE UP (ref 27–34)
MCHC RBC-ENTMCNC: 32.9 GM/DL — SIGNIFICANT CHANGE UP (ref 32–36)
MCV RBC AUTO: 93.8 FL — SIGNIFICANT CHANGE UP (ref 80–100)
PLATELET # BLD AUTO: 181 K/UL — SIGNIFICANT CHANGE UP (ref 150–400)
POTASSIUM SERPL-MCNC: 4 MMOL/L — SIGNIFICANT CHANGE UP (ref 3.5–5.3)
POTASSIUM SERPL-SCNC: 4 MMOL/L — SIGNIFICANT CHANGE UP (ref 3.5–5.3)
RBC # BLD: 4.37 M/UL — SIGNIFICANT CHANGE UP (ref 4.2–5.8)
RBC # FLD: 13.5 % — SIGNIFICANT CHANGE UP (ref 10.3–14.5)
SODIUM SERPL-SCNC: 143 MMOL/L — SIGNIFICANT CHANGE UP (ref 135–145)
WBC # BLD: 8.71 K/UL — SIGNIFICANT CHANGE UP (ref 3.8–10.5)
WBC # FLD AUTO: 8.71 K/UL — SIGNIFICANT CHANGE UP (ref 3.8–10.5)

## 2023-03-14 PROCEDURE — 83735 ASSAY OF MAGNESIUM: CPT

## 2023-03-14 PROCEDURE — 36415 COLL VENOUS BLD VENIPUNCTURE: CPT

## 2023-03-14 PROCEDURE — C1759: CPT

## 2023-03-14 PROCEDURE — 85730 THROMBOPLASTIN TIME PARTIAL: CPT

## 2023-03-14 PROCEDURE — 93656 COMPRE EP EVAL ABLTJ ATR FIB: CPT

## 2023-03-14 PROCEDURE — 85610 PROTHROMBIN TIME: CPT

## 2023-03-14 PROCEDURE — C1732: CPT

## 2023-03-14 PROCEDURE — 80048 BASIC METABOLIC PNL TOTAL CA: CPT

## 2023-03-14 PROCEDURE — 86900 BLOOD TYPING SEROLOGIC ABO: CPT

## 2023-03-14 PROCEDURE — 85027 COMPLETE CBC AUTOMATED: CPT

## 2023-03-14 PROCEDURE — 86901 BLOOD TYPING SEROLOGIC RH(D): CPT

## 2023-03-14 PROCEDURE — C1769: CPT

## 2023-03-14 PROCEDURE — 86850 RBC ANTIBODY SCREEN: CPT

## 2023-03-14 PROCEDURE — 93005 ELECTROCARDIOGRAM TRACING: CPT

## 2023-03-14 PROCEDURE — C1894: CPT

## 2023-03-14 PROCEDURE — 93657 TX L/R ATRIAL FIB ADDL: CPT

## 2023-03-14 PROCEDURE — 93010 ELECTROCARDIOGRAM REPORT: CPT

## 2023-03-14 PROCEDURE — C1730: CPT

## 2023-03-14 RX ORDER — METOPROLOL TARTRATE 50 MG
1 TABLET ORAL
Qty: 30 | Refills: 3
Start: 2023-03-14 | End: 2023-07-11

## 2023-03-14 RX ORDER — METOPROLOL TARTRATE 50 MG
1 TABLET ORAL
Qty: 0 | Refills: 0 | DISCHARGE

## 2023-03-14 RX ORDER — PANTOPRAZOLE SODIUM 20 MG/1
1 TABLET, DELAYED RELEASE ORAL
Qty: 70 | Refills: 0
Start: 2023-03-14 | End: 2023-03-27

## 2023-03-14 RX ORDER — SUCRALFATE 1 G
10 TABLET ORAL
Qty: 300 | Refills: 0
Start: 2023-03-14 | End: 2023-03-27

## 2023-03-14 RX ORDER — FUROSEMIDE 40 MG
1 TABLET ORAL
Qty: 2 | Refills: 0
Start: 2023-03-14 | End: 2023-03-15

## 2023-03-14 RX ADMIN — PANTOPRAZOLE SODIUM 40 MILLIGRAM(S): 20 TABLET, DELAYED RELEASE ORAL at 05:20

## 2023-03-14 RX ADMIN — Medication 100 MILLIGRAM(S): at 05:19

## 2023-03-14 RX ADMIN — DABIGATRAN ETEXILATE MESYLATE 150 MILLIGRAM(S): 150 CAPSULE ORAL at 05:19

## 2023-03-14 RX ADMIN — AMLODIPINE BESYLATE 5 MILLIGRAM(S): 2.5 TABLET ORAL at 05:20

## 2023-03-14 RX ADMIN — Medication 1 GRAM(S): at 05:19

## 2023-03-14 RX ADMIN — Medication 650 MILLIGRAM(S): at 00:35

## 2023-03-14 RX ADMIN — LISINOPRIL 20 MILLIGRAM(S): 2.5 TABLET ORAL at 05:19

## 2023-03-14 RX ADMIN — Medication 20 MILLIGRAM(S): at 05:20

## 2023-03-14 NOTE — PROGRESS NOTE ADULT - SUBJECTIVE AND OBJECTIVE BOX
Pt doing well POD #1 s/p atrial fibrillation ablation (WACA/PVI + CTI) via b/l [facility] access. Pt seen and examined, reports feeling well, denies any complaints. Morning labs and telemetry reviewed. Bilateral groin sutures removed without incident.     EKG:  TELE: SR, SB    MEDICATIONS  (STANDING):  allopurinol 100 milliGRAM(s) Oral daily  amLODIPine   Tablet 5 milliGRAM(s) Oral daily  aspirin enteric coated 81 milliGRAM(s) Oral daily  atorvastatin 10 milliGRAM(s) Oral at bedtime  dabigatran 150 milliGRAM(s) Oral <User Schedule>  flecainide 100 milliGRAM(s) Oral two times a day  furosemide    Tablet 20 milliGRAM(s) Oral daily  lisinopril 20 milliGRAM(s) Oral daily  metoprolol tartrate 25 milliGRAM(s) Oral once  pantoprazole    Tablet 40 milliGRAM(s) Oral every 12 hours  sucralfate suspension 1 Gram(s) Oral two times a day    MEDICATIONS  (PRN):  acetaminophen     Tablet .. 650 milliGRAM(s) Oral every 6 hours PRN Mild Pain (1 - 3)  ALPRAZolam 0.25 milliGRAM(s) Oral every 6 hours PRN anxiety/ insomnia  aluminum hydroxide/magnesium hydroxide/simethicone Suspension 30 milliLiter(s) Oral every 4 hours PRN Dyspepsia  benzocaine/menthol Lozenge 1 Lozenge Oral every 2 hours PRN Sore Throat  ondansetron Injectable 4 milliGRAM(s) IV Push every 8 hours PRN Nausea and/or Vomiting      Allergies    No Known Allergies    Intolerances      PAST MEDICAL & SURGICAL HISTORY:  Hypertension      Gout      High cholesterol      Paroxysmal atrial fibrillation      H/O hand surgery  2001      H/O knee surgery          Vital Signs Last 24 Hrs  T(C): 36.9 (14 Mar 2023 05:30), Max: 36.9 (14 Mar 2023 05:30)  T(F): 98.5 (14 Mar 2023 05:30), Max: 98.5 (14 Mar 2023 05:30)  HR: 58 (14 Mar 2023 05:30) (57 - 70)  BP: 153/75 (14 Mar 2023 05:30) (120/59 - 153/75)  BP(mean): 96 (13 Mar 2023 23:30) (85 - 104)  RR: 17 (14 Mar 2023 05:30) (12 - 22)  SpO2: 97% (14 Mar 2023 05:30) (91% - 97%)    Parameters below as of 14 Mar 2023 05:30  Patient On (Oxygen Delivery Method): room air        Physical Exam:  Constitutional: NAD, AAOx3  Cardiovascular: +S1S2 RRR  Pulmonary: CTA b/l, unlabored  Abd: soft NTND +BS  Groins: C/D/I bilaterally; no bleeding, hematoma, edema  Extremities: no pedal edema, +distal pulses b/l  Neuro: non focal, CAMACHO x4    LABS:                        13.5   8.71  )-----------( 181      ( 14 Mar 2023 05:30 )             41.0     03-14    143  |  104  |  21.3<H>  ----------------------------<  99  4.0   |  24.0  |  1.37<H>    Ca    9.2      14 Mar 2023 05:30  Mg     2.0     03-14      PT/INR - ( 13 Mar 2023 06:50 )   PT: 12.4 sec;   INR: 1.07 ratio         PTT - ( 13 Mar 2023 06:50 )  PTT:27.9 sec      Assessment:   *** y/o female h/o ***; now POD #1 s/p ***.     Plan:     Access site care and activity limitations reviewed w/ pt.   Outpt f/up in 2-4 weeks.    Pt doing well POD #1 s/p atrial fibrillation ablation (WACA/PVI + CTI) via b/l [facility] access. Pt seen and examined, reports feeling well, denies any complaints. Morning labs and telemetry reviewed. Bilateral groin sutures removed without incident.     EKG:  TELE: SR, SB    MEDICATIONS  (STANDING):  allopurinol 100 milliGRAM(s) Oral daily  amLODIPine   Tablet 5 milliGRAM(s) Oral daily  aspirin enteric coated 81 milliGRAM(s) Oral daily  atorvastatin 10 milliGRAM(s) Oral at bedtime  dabigatran 150 milliGRAM(s) Oral <User Schedule>  flecainide 100 milliGRAM(s) Oral two times a day  furosemide    Tablet 20 milliGRAM(s) Oral daily  lisinopril 20 milliGRAM(s) Oral daily  metoprolol tartrate 25 milliGRAM(s) Oral once  pantoprazole    Tablet 40 milliGRAM(s) Oral every 12 hours  sucralfate suspension 1 Gram(s) Oral two times a day    MEDICATIONS  (PRN):  acetaminophen     Tablet .. 650 milliGRAM(s) Oral every 6 hours PRN Mild Pain (1 - 3)  ALPRAZolam 0.25 milliGRAM(s) Oral every 6 hours PRN anxiety/ insomnia  aluminum hydroxide/magnesium hydroxide/simethicone Suspension 30 milliLiter(s) Oral every 4 hours PRN Dyspepsia  benzocaine/menthol Lozenge 1 Lozenge Oral every 2 hours PRN Sore Throat  ondansetron Injectable 4 milliGRAM(s) IV Push every 8 hours PRN Nausea and/or Vomiting      Allergies  No Known Allergies      PAST MEDICAL & SURGICAL HISTORY:  Hypertension  Gout  High cholesterol  Paroxysmal atrial fibrillation  H/O hand surgery  2001  H/O knee surgery      Vital Signs Last 24 Hrs  T(C): 36.9 (14 Mar 2023 05:30), Max: 36.9 (14 Mar 2023 05:30)  T(F): 98.5 (14 Mar 2023 05:30), Max: 98.5 (14 Mar 2023 05:30)  HR: 58 (14 Mar 2023 05:30) (57 - 70)  BP: 153/75 (14 Mar 2023 05:30) (120/59 - 153/75)  BP(mean): 96 (13 Mar 2023 23:30) (85 - 104)  RR: 17 (14 Mar 2023 05:30) (12 - 22)  SpO2: 97% (14 Mar 2023 05:30) (91% - 97%)    Parameters below as of 14 Mar 2023 05:30  Patient On (Oxygen Delivery Method): room air        Physical Exam:  Constitutional: NAD, AAOx3  Cardiovascular: +S1S2 RRR  Pulmonary: CTA b/l, unlabored  Abd: soft NTND +BS  Groins: C/D/I bilaterally; no bleeding, hematoma, edema  Extremities: no pedal edema, +distal pulses b/l  Neuro: non focal, CAMACHO x4    LABS:                        13.5   8.71  )-----------( 181      ( 14 Mar 2023 05:30 )             41.0     03-14    143  |  104  |  21.3<H>  ----------------------------<  99  4.0   |  24.0  |  1.37<H>    Ca    9.2      14 Mar 2023 05:30  Mg     2.0     03-14      PT/INR - ( 13 Mar 2023 06:50 )   PT: 12.4 sec;   INR: 1.07 ratio         PTT - ( 13 Mar 2023 06:50 )  PTT:27.9 sec      Assessment:   62 year old male with PMH of paroxysmal atrial fibrillation, HTN, HLD, and gout. The patient was first diagnosed with atrial fibrillation in September of 2022.  He had been experiencing some shortness of breath and noted his heart rate to be close to 200 bpm on his wife's Apple Watch. He was eventually diagnosed with atrial fibrillation and outpatient monitoring demonstrated paroxysmal AF with an 11% burden.  He was again in the ER recently with atrial fibrillation with RVR. He self-converted to sinus rhythm and was started on flecainide and metoprolol with eventual plans for ablation. He presented electively on 3/13/2023 for and is now POD #1 status post uncomplicated radiofrequency ablation of atrial fibrillation.      Plan:   Continue uninterupted Pradaxa twice daily.  Lasix 20mg PO daily x 3 days.   Metoprolol 25mg daily.   Protonix 40mg twice daily x 2 weeks, then once daily x 6 weeks.   Carafate 1gm BID x 2 weeks.  Access site care and activity limitations reviewed w/ pt.   Outpt f/up in 2-4 weeks.    Pt doing well POD #1 s/p atrial fibrillation ablation (WACA/PVI + CTI) via b/l [facility] access. Pt seen and examined, reports feeling well, denies any complaints. Morning labs and telemetry reviewed. Bilateral groin sutures removed without incident.     EKG: SR, HR 59bpm, MD 188ms, QRSD 94ms  TELE: SR, SB    MEDICATIONS  (STANDING):  allopurinol 100 milliGRAM(s) Oral daily  amLODIPine   Tablet 5 milliGRAM(s) Oral daily  aspirin enteric coated 81 milliGRAM(s) Oral daily  atorvastatin 10 milliGRAM(s) Oral at bedtime  dabigatran 150 milliGRAM(s) Oral <User Schedule>  flecainide 100 milliGRAM(s) Oral two times a day  furosemide    Tablet 20 milliGRAM(s) Oral daily  lisinopril 20 milliGRAM(s) Oral daily  metoprolol tartrate 25 milliGRAM(s) Oral once  pantoprazole    Tablet 40 milliGRAM(s) Oral every 12 hours  sucralfate suspension 1 Gram(s) Oral two times a day    MEDICATIONS  (PRN):  acetaminophen     Tablet .. 650 milliGRAM(s) Oral every 6 hours PRN Mild Pain (1 - 3)  ALPRAZolam 0.25 milliGRAM(s) Oral every 6 hours PRN anxiety/ insomnia  aluminum hydroxide/magnesium hydroxide/simethicone Suspension 30 milliLiter(s) Oral every 4 hours PRN Dyspepsia  benzocaine/menthol Lozenge 1 Lozenge Oral every 2 hours PRN Sore Throat  ondansetron Injectable 4 milliGRAM(s) IV Push every 8 hours PRN Nausea and/or Vomiting      Allergies  No Known Allergies      PAST MEDICAL & SURGICAL HISTORY:  Hypertension  Gout  High cholesterol  Paroxysmal atrial fibrillation  H/O hand surgery  2001  H/O knee surgery      Vital Signs Last 24 Hrs  T(C): 36.9 (14 Mar 2023 05:30), Max: 36.9 (14 Mar 2023 05:30)  T(F): 98.5 (14 Mar 2023 05:30), Max: 98.5 (14 Mar 2023 05:30)  HR: 58 (14 Mar 2023 05:30) (57 - 70)  BP: 153/75 (14 Mar 2023 05:30) (120/59 - 153/75)  BP(mean): 96 (13 Mar 2023 23:30) (85 - 104)  RR: 17 (14 Mar 2023 05:30) (12 - 22)  SpO2: 97% (14 Mar 2023 05:30) (91% - 97%)    Parameters below as of 14 Mar 2023 05:30  Patient On (Oxygen Delivery Method): room air        Physical Exam:  Constitutional: NAD, AAOx3  Cardiovascular: +S1S2 RRR  Pulmonary: CTA b/l, unlabored  Abd: soft NTND +BS  Groins: C/D/I bilaterally; no bleeding, hematoma, edema  Extremities: no pedal edema, +distal pulses b/l  Neuro: non focal, CAMACHO x4    LABS:                        13.5   8.71  )-----------( 181      ( 14 Mar 2023 05:30 )             41.0     03-14    143  |  104  |  21.3<H>  ----------------------------<  99  4.0   |  24.0  |  1.37<H>    Ca    9.2      14 Mar 2023 05:30  Mg     2.0     03-14      PT/INR - ( 13 Mar 2023 06:50 )   PT: 12.4 sec;   INR: 1.07 ratio         PTT - ( 13 Mar 2023 06:50 )  PTT:27.9 sec      Assessment:   62 year old male with PMH of paroxysmal atrial fibrillation, HTN, HLD, and gout. The patient was first diagnosed with atrial fibrillation in September of 2022.  He had been experiencing some shortness of breath and noted his heart rate to be close to 200 bpm on his wife's Apple Watch. He was eventually diagnosed with atrial fibrillation and outpatient monitoring demonstrated paroxysmal AF with an 11% burden.  He was again in the ER recently with atrial fibrillation with RVR. He self-converted to sinus rhythm and was started on flecainide and metoprolol with eventual plans for ablation. He presented electively on 3/13/2023 for and is now POD #1 status post uncomplicated radiofrequency ablation of atrial fibrillation.      Plan:   Continue uninterupted Pradaxa twice daily.  Lasix 20mg PO daily x 3 days.   Metoprolol 25mg daily.   Protonix 40mg twice daily x 2 weeks, then once daily x 6 weeks.   Carafate 1gm BID x 2 weeks.  Access site care and activity limitations reviewed w/ pt.   Outpt f/up in 2-4 weeks.

## 2023-03-14 NOTE — DISCHARGE NOTE NURSING/CASE MANAGEMENT/SOCIAL WORK - PATIENT PORTAL LINK FT
You can access the FollowMyHealth Patient Portal offered by [facility] by registering at the following website: [email or web address]. By joining [facility]’s FollowMyHealth portal, you will also be able to view your health information using other applications (apps) compatible with our system.

## 2023-03-14 NOTE — DISCHARGE NOTE NURSING/CASE MANAGEMENT/SOCIAL WORK - NSDCPEFALRISK_GEN_ALL_CORE
For information on Fall & Injury Prevention, visit: [email or web address] OR  [email or web address] OR  https://www.cdc.gov/steadi/patient.html

## 2023-03-14 NOTE — DISCHARGE NOTE NURSING/CASE MANAGEMENT/SOCIAL WORK - NSDCFUADDAPPT_GEN_ALL_CORE_FT
Our office will contact you in 3-5 days to schedule this appointment. Please call [phone] with questions or concerns.

## 2023-03-16 NOTE — ED CDU PROVIDER DISPOSITION NOTE - NSCDUDISPOSITION_ED_ALL_ED_DT
[address] [facility] [address] [address] EMERGENCY DEPT      EMERGENCY MEDICINE     Pt Name: Mariano Tracy  MRN: [id number]  [place] 1999  Date of evaluation: 3/15/2023  Provider: Do Marti MD    CHIEF COMPLAINT       Chief Complaint   Patient presents with    Allergic Reaction    Shortness of Breath     HISTORY OF PRESENT ILLNESS   Mariano Tracy is a pleasant 22 y.o. female who presents to the emergency department from from home,     for evaluation of allergic reaction. Patient reports she developed some bumps on her lips for which her friend provided her with acyclovir assuming this was a cold sore. After taking the acyclovir she developed a generalized rash which has progressed to mild shortness of breath and she presents to the ED for evaluation. She denies abdominal pain, nausea, vomiting. She denies sore throat, sensation of throat swelling, tongue swelling, lip swelling. She denies chest pain. The rash is diffuse and pruritic in nature. She denies lip pain. She denies intraoral lesions. PASTMEDICAL HISTORY     Past Medical History:   Diagnosis Date    Polycystic ovary disease        Patient Active Problem List   Diagnosis Code    Obesity E66.9    Pre-diabetes R73.03    Bleeding R58    Vaginal spotting N93.9    Fall at home, initial encounter [address] Viktor Eaton, Y92.009    Pregnancy complication, antepartum O26.90    Pregnancy complication before birth O99.891    Vaginal delivery O80    Acute cholecystitis K81.0     SURGICAL HISTORY       Past Surgical History:   Procedure Laterality Date    CHOLECYSTECTOMY, LAPAROSCOPIC N/A 1/30/2023    Laparoscopic Cholecystectomy performed by Seward Saint, MD at [facility] Left     wrist    [address]       Discharge Medication List as of 3/16/2023 12:57 AM        CONTINUE these medications which have NOT CHANGED    Details   ibuprofen (ADVIL;MOTRIN) 600 MG tablet Take 1 tablet by mouth every 6 hours as needed for Pain, Disp-30 tablet, R-1OTC      acetaminophen (AMINOFEN) 325 MG tablet Take 2 tablets by mouth every 6 hours as needed for Pain, Disp-120 tablet, R-3OTC             ALLERGIES     is allergic to acyclovir. FAMILY HISTORY     She indicated that her mother is alive. She indicated that her father is alive. She indicated that the status of her sister is unknown. She indicated that the status of her maternal grandmother is unknown. She indicated that the status of her paternal grandmother is unknown. She indicated that the status of her paternal aunt is unknown. SOCIAL HISTORY       Social History     Tobacco Use    Smoking status: Never    Smokeless tobacco: Never   Vaping Use    Vaping Use: Never used   Substance Use Topics    Alcohol use: No    Drug use: Yes     Types: Marijuana Laveda [place])     Comment: last use- July 2022       PHYSICAL EXAM       ED Triage Vitals [03/15/23 2045]   BP Temp Temp Source Heart Rate Resp SpO2 Height Weight   (!) 153/85 98.6 °F (37 °C) Oral 84 18 96 % 5' 6\" (1.676 m) (!) 358 lb (162.4 kg)       Additional Vital Signs:  Vitals:    03/16/23 0031   BP: 121/79   Pulse: 76   Resp: 18   Temp:    SpO2: 97%     Physical Exam  Constitutional:  Well developed, well nourished, no acute distress, non-toxic appearance   Eyes:  PERRL, conjunctiva normal   HENT:  Atraumatic, external ears normal, nose normal, oropharynx moist, no pharyngeal exudates. No abnormality noted on lips  Neck- normal range of motion, no tenderness, supple   Respiratory:  No respiratory distress, normal breath sounds, no rales, no wheezing   Cardiovascular:  Normal rate, normal rhythm, no murmurs, no gallops, no rubs   GI:  Soft, nondistended, nontender, no organomegaly, no mass, no rebound, no guarding   Musculoskeletal:  No edema, no tenderness, no deformities.    Integument: Diffuse urticarial rash  Neurologic:  Alert & oriented x 3, no focal deficits noted   Psychiatric:  Speech and behavior appropriate  FORMAL DIAGNOSTIC RESULTS     RADIOLOGY: Interpretation per the Radiologist below, if available at the time of this note (none if blank):    No orders to display       LABS: (none if blank)  Labs Reviewed - No data to display    (Any cultures that may have been sent were not resulted at the time of this patient visit)    MEDICAL DECISION MAKING / ED COURSE:     1) Number and Complexity of Problems            Problem List This Visit:         Chief Complaint   Patient presents with    Allergic Reaction    Shortness of Breath            Differential Diagnosis includes (but not limited to):  Anaphylaxis        Diagnoses Considered but I have low suspicion of:   Anaphylactic shock             Pertinent Comorbid Conditions:    None    2)  Data Reviewed (none if left blank)          My Independent interpretations:                    Decision Rules/Clinical Scores utilized: None            External Documentation Reviewed:         Previous patient encounter documents & history available on EMR was reviewed              See Formal Diagnostic Results above for the lab and radiology tests and orders.    3)  Treatment and Disposition         ED Reassessment: Urticarial rash as well as shortness of breath that developed after taking acyclovir.  Considering the shortness of breath the patient was given IM epinephrine along with steroids and antihistamines.  She was monitored in the emergency department for several hours.  Her symptoms completely resolved and her rash resolved entirely.  There was no recurrence of the symptoms.  Acyclovir was listed as an allergy and she was recommended not to take any medications from friends and family.  With regards to her lips.  There is no notable lesions at this time, suspect the patient may have had a contact dermatitis from her lipstick that she missed took for a cold sore.  She will follow-up in the outpatient setting.  Strict return precautions discussed.         Case discussed with consulting  clinician: None         Shared Decision-Making was performed and disposition discussed with the        Patient/Family and questions answered          Social determinants of health impacting treatment or disposition: None         Code Status: Did not discuss      Summary of Patient Presentation:      MDM  /   Vitals Reviewed:    Vitals:    03/15/23 2134 03/15/23 2228 03/15/23 2322 03/16/23 0031   BP: 122/77 133/82 135/77 121/79   Pulse: 77 79 78 76   Resp: 16 18 18 18   Temp:       TempSrc:       SpO2: 97% 97% 97% 97%   Weight:       Height:           The patient was seen and examined. Appropriate diagnostic testing was performed and results reviewed with the patient. The results of pertinent diagnostic studies and exam findings were discussed. The patients provisional diagnosis and plan of care were discussed with the patient and present family who expressed understanding. Any medications were reviewed and indications and risks of medications were discussed with the patient /family present. Strict verbal and written return precautions, instructions and appropriate follow-up provided to  the patient. ED Medications administered this visit:  (None if blank)  Medications   methylPREDNISolone sodium (SOLU-MEDROL) injection 125 mg (125 mg IntraVENous Given 3/15/23 2214)   famotidine (PEPCID) 20 mg in sodium chloride (PF) 0.9 % 10 mL injection (20 mg IntraVENous Given 3/15/23 2213)   diphenhydrAMINE (BENADRYL) injection 50 mg (50 mg IntraVENous Given 3/15/23 2214)   EPINEPHrine 1 MG/ML injection 0.3 mg (0.3 mg IntraMUSCular Given 3/15/23 2215)   lactated ringers bolus (0 mLs IntraVENous Stopped 3/15/23 2351)         PROCEDURES: (None if blank)  Procedures:     CRITICAL CARE: (None if blank)      DISCHARGE PRESCRIPTIONS: (None if blank)  Discharge Medication List as of 3/16/2023 12:57 AM          FINAL IMPRESSION      1.  Anaphylaxis, initial encounter          DISPOSITION/PLAN   DISPOSITION Decision To Discharge 03/16/2023 12:52:03 AM      OUTPATIENT FOLLOW UP THE PATIENT:  Nicola Deo, APRN - [facility]  [address]  [phone]    In 3 days      MD Chrissie Bautista MD  03/16/23 0537 30-Jan-2023 13:09

## 2023-03-20 PROBLEM — I48.0 PAROXYSMAL ATRIAL FIBRILLATION: Chronic | Status: ACTIVE | Noted: 2023-03-13

## 2023-03-22 ENCOUNTER — NON-APPOINTMENT (OUTPATIENT)
Age: 63
End: 2023-03-22

## 2023-03-24 ENCOUNTER — APPOINTMENT (OUTPATIENT)
Dept: FAMILY MEDICINE | Facility: CLINIC | Age: 63
End: 2023-03-24

## 2023-03-27 ENCOUNTER — APPOINTMENT (OUTPATIENT)
Dept: CARDIOLOGY | Facility: CLINIC | Age: 63
End: 2023-03-27
Payer: COMMERCIAL

## 2023-03-27 ENCOUNTER — NON-APPOINTMENT (OUTPATIENT)
Age: 63
End: 2023-03-27

## 2023-03-27 VITALS
HEIGHT: 70 IN | BODY MASS INDEX: 29.12 KG/M2 | TEMPERATURE: 98.6 F | OXYGEN SATURATION: 95 % | SYSTOLIC BLOOD PRESSURE: 100 MMHG | DIASTOLIC BLOOD PRESSURE: 60 MMHG | WEIGHT: 203.38 LBS | HEART RATE: 57 BPM

## 2023-03-27 DIAGNOSIS — Z98.890 OTHER SPECIFIED POSTPROCEDURAL STATES: ICD-10-CM

## 2023-03-27 DIAGNOSIS — Z86.79 OTHER SPECIFIED POSTPROCEDURAL STATES: ICD-10-CM

## 2023-03-27 PROCEDURE — 93000 ELECTROCARDIOGRAM COMPLETE: CPT

## 2023-03-27 PROCEDURE — 99215 OFFICE O/P EST HI 40 MIN: CPT | Mod: 25

## 2023-03-31 ENCOUNTER — APPOINTMENT (OUTPATIENT)
Dept: CARDIOLOGY | Facility: CLINIC | Age: 63
End: 2023-03-31

## 2023-04-10 ENCOUNTER — NON-APPOINTMENT (OUTPATIENT)
Age: 63
End: 2023-04-10

## 2023-04-12 ENCOUNTER — APPOINTMENT (OUTPATIENT)
Dept: ELECTROPHYSIOLOGY | Facility: CLINIC | Age: 63
End: 2023-04-12
Payer: COMMERCIAL

## 2023-04-12 VITALS
SYSTOLIC BLOOD PRESSURE: 108 MMHG | OXYGEN SATURATION: 94 % | DIASTOLIC BLOOD PRESSURE: 64 MMHG | HEIGHT: 70 IN | HEART RATE: 57 BPM | BODY MASS INDEX: 29.2 KG/M2 | TEMPERATURE: 98.8 F | WEIGHT: 204 LBS

## 2023-04-12 PROCEDURE — 99213 OFFICE O/P EST LOW 20 MIN: CPT | Mod: 25

## 2023-04-12 PROCEDURE — 93000 ELECTROCARDIOGRAM COMPLETE: CPT

## 2023-04-12 RX ORDER — SUCRALFATE 1 G/10ML
1 SUSPENSION ORAL
Refills: 0 | Status: DISCONTINUED | COMMUNITY
End: 2023-04-12

## 2023-04-19 ENCOUNTER — APPOINTMENT (OUTPATIENT)
Dept: FAMILY MEDICINE | Facility: CLINIC | Age: 63
End: 2023-04-19

## 2023-05-02 ENCOUNTER — NON-APPOINTMENT (OUTPATIENT)
Age: 63
End: 2023-05-02

## 2023-05-02 NOTE — DISCUSSION/SUMMARY
[FreeTextEntry1] : In summary, Mr. Kimbrough is a 62 year old male with a history of symptomatic paroxysmal atrial fibrillation and HTN. The patient underwent ablation for atrial fibrillation recently. He has done well since the procedure with no recurrences since. He is on flecainide, metoprolol and eliquis. He will stop flecainide and eliquis in 2 months (CHADSVASC score 1). He will return for follow up in 3 months - he will wear a monitor at that to assess for any AF. \par  [EKG obtained to assist in diagnosis and management of assessed problem(s)] : EKG obtained to assist in diagnosis and management of assessed problem(s)

## 2023-05-02 NOTE — HISTORY OF PRESENT ILLNESS
[FreeTextEntry1] : Mr. Kimbrough is a pleasant 62 year old male here for follow up today. The patient has a history of paroxysmal atrial fibrillation, HTN and gout. He recently underwent ablation for atrial fibrillation. \par \par To summarize, the patient was first diagnosed with atrial fibrillation in September of 2022. The patient had been experiencing some shortness of breath and noted his heart rate to be close to 200 bpm on his wife's Apple Watch. He was eventually diagnosed with atrial fibrillation and outpatient monitoring demonstrated paroxysmal AF with an 11% burden.He was again in the ER earlier this year with atrial fibrillation and RVR. He self-converted to sinus rhythm and was started on flecainide and metoprolol. He was on aspirin (CHADSVASC score 1). He underwent ablation for atrial fibrillation on 3/13/23 (PVI+CTI). He was started on eliquis after the ablation procedure. He is here for follow up today. He reports to be doing well and denies any symptoms of palpitations, shortness of breath, dizziness, syncope or bleeding. He is tolerating his medications well.

## 2023-05-25 ENCOUNTER — APPOINTMENT (OUTPATIENT)
Dept: FAMILY MEDICINE | Facility: CLINIC | Age: 63
End: 2023-05-25

## 2023-06-25 ENCOUNTER — RX RENEWAL (OUTPATIENT)
Age: 63
End: 2023-06-25

## 2023-06-30 ENCOUNTER — APPOINTMENT (OUTPATIENT)
Dept: PULMONOLOGY | Facility: CLINIC | Age: 63
End: 2023-06-30
Payer: COMMERCIAL

## 2023-06-30 VITALS
DIASTOLIC BLOOD PRESSURE: 70 MMHG | SYSTOLIC BLOOD PRESSURE: 118 MMHG | HEART RATE: 54 BPM | RESPIRATION RATE: 16 BRPM | OXYGEN SATURATION: 97 %

## 2023-06-30 VITALS — HEIGHT: 69 IN | BODY MASS INDEX: 29.47 KG/M2 | WEIGHT: 199 LBS

## 2023-06-30 PROCEDURE — 94727 GAS DIL/WSHOT DETER LNG VOL: CPT

## 2023-06-30 PROCEDURE — 99214 OFFICE O/P EST MOD 30 MIN: CPT | Mod: 25

## 2023-06-30 PROCEDURE — 94729 DIFFUSING CAPACITY: CPT

## 2023-06-30 PROCEDURE — 85018 HEMOGLOBIN: CPT | Mod: QW

## 2023-06-30 PROCEDURE — 94010 BREATHING CAPACITY TEST: CPT

## 2023-06-30 RX ORDER — DABIGATRAN ETEXILATE 150 MG/1
150 CAPSULE ORAL TWICE DAILY
Qty: 60 | Refills: 1 | Status: DISCONTINUED | COMMUNITY
End: 2023-06-30

## 2023-06-30 RX ORDER — AMLODIPINE BESYLATE 5 MG/1
5 TABLET ORAL DAILY
Refills: 0 | Status: DISCONTINUED | COMMUNITY
End: 2023-06-30

## 2023-06-30 RX ORDER — FUROSEMIDE 20 MG/1
20 TABLET ORAL TWICE DAILY
Refills: 0 | Status: DISCONTINUED | COMMUNITY
End: 2023-06-30

## 2023-06-30 RX ORDER — OMEPRAZOLE 40 MG/1
40 CAPSULE, DELAYED RELEASE ORAL
Qty: 30 | Refills: 2 | Status: DISCONTINUED | COMMUNITY
Start: 2021-12-22 | End: 2023-06-30

## 2023-06-30 NOTE — REVIEW OF SYSTEMS
[Negative] : Psychiatric [FreeTextEntry3] : per HPI [FreeTextEntry8] : per HPI [FreeTextEntry9] : per HPI [de-identified] : per HPI

## 2023-06-30 NOTE — PHYSICAL EXAM
[General Appearance - In No Acute Distress] : no acute distress [Low Lying Soft Palate] : low lying soft palate [Elongated Uvula] : elongated uvula [Enlarged Base of the Tongue] : enlargement of the base of the tongue [III] : III [Heart Sounds] : normal S1 and S2 [] : no respiratory distress [Respiration, Rhythm And Depth] : normal respiratory rhythm and effort [Exaggerated Use Of Accessory Muscles For Inspiration] : no accessory muscle use [Auscultation Breath Sounds / Voice Sounds] : lungs were clear to auscultation bilaterally [Skin Color & Pigmentation] : normal skin color and pigmentation [Oriented To Time, Place, And Person] : oriented to person, place, and time [Impaired Insight] : insight and judgment were intact [Affect] : the affect was normal [Normal Oropharynx] : abnormal oropharynx

## 2023-06-30 NOTE — HISTORY OF PRESENT ILLNESS
[Date: ___] : Date of most recent diagnostic polysomnogram: [unfilled] [AHI: ___ per hour] : Apnea-hypopnea index:  [unfilled] per hour [FreeTextEntry1] : Dx with Afib. Advised sleep eval. \par \par He snores, no known witnessed apneas, episodes of nonrestorative sleep.  HST done 2/10/23 with mild/mod NICO. Ordered autoPAP but he changed his mind decided to go with OA. \par \par Schedule:\par To bed: 10p\par Latency: 20-45 min\par WASO: once\par OOB: 6a; w/o alarm.\par \par Works with machines. Polishing glass, using powder to polish.\par \par On initial visit reports had SOB with exertion for the past 2-3 months. No wheeze. Occ sporadic cough. Says he feels much better. \par \par PFT is normal. \par \par Had covid 4/2020. Told had covid pna. \par \par Hx afib. Plan on ablation. \par \par Lifelong nonsmoker. \par \par

## 2023-06-30 NOTE — ASSESSMENT
[FreeTextEntry1] : Mild to mod NICO with hypersomnolence, difficult to control afib; NICO should be treated. He had SOB; cxr clear, lungs clear on exam, PFT normal, no wheeze, lifelong nonsmoker; no evidenc of lung dz; feeling better; could be on basis of afib..

## 2023-07-12 ENCOUNTER — APPOINTMENT (OUTPATIENT)
Dept: ELECTROPHYSIOLOGY | Facility: CLINIC | Age: 63
End: 2023-07-12
Payer: COMMERCIAL

## 2023-07-12 ENCOUNTER — NON-APPOINTMENT (OUTPATIENT)
Age: 63
End: 2023-07-12

## 2023-07-12 VITALS
OXYGEN SATURATION: 97 % | WEIGHT: 201 LBS | SYSTOLIC BLOOD PRESSURE: 138 MMHG | TEMPERATURE: 98.1 F | HEART RATE: 59 BPM | BODY MASS INDEX: 29.77 KG/M2 | HEIGHT: 69 IN | DIASTOLIC BLOOD PRESSURE: 62 MMHG

## 2023-07-12 PROCEDURE — 93246 EXT ECG>7D<15D RECORDING: CPT

## 2023-07-12 PROCEDURE — 99214 OFFICE O/P EST MOD 30 MIN: CPT | Mod: 25

## 2023-07-12 PROCEDURE — 93000 ELECTROCARDIOGRAM COMPLETE: CPT

## 2023-07-12 NOTE — HISTORY OF PRESENT ILLNESS
[FreeTextEntry1] : Mr. Kimbrough is a pleasant 62 year old male here for follow up today. The patient has a history of paroxysmal atrial fibrillation s/p ablation, HTN and gout. \par \par To summarize, the patient was first diagnosed with atrial fibrillation in September of 2022. The patient had been experiencing some shortness of breath and noted his heart rate to be close to 200 bpm on his wife's Apple Watch. He was eventually diagnosed with atrial fibrillation and outpatient monitoring demonstrated paroxysmal AF with an 11% burden.He was again in the ER earlier this year with atrial fibrillation and RVR. He self-converted to sinus rhythm and was started on flecainide and metoprolol. He was on aspirin (CHADSVASC score 1). He continued to have symptomatic AF while on flecainide. He eventually underwent ablation for atrial fibrillation on 3/13/23 (PVI+CTI). He was started on eliquis after the ablation procedure. He is here for follow up today. He reports to be doing well and denies any symptoms of palpitations, shortness of breath, dizziness, syncope or bleeding. He is tolerating his medications well. Eliquis was stopped 3 months post-ablation. He is on aspirin 81 mg daily, He is also on flecainide and metoprolol.

## 2023-07-12 NOTE — DISCUSSION/SUMMARY
[FreeTextEntry1] : In summary, Mr. Kimbrough is a 62 year old male with a history of symptomatic paroxysmal atrial fibrillation and HTN. The patient underwent ablation for atrial fibrillation on 3/13/23. He has done well since the procedure with no symptomatic recurrences. He is on flecainide and metoprolol. Eliquis was stopped 3 months after the ablation procedure (CHADSVASC score 1). He is on aspirin 81 mg daily. I will stop flecainide today. He will wear a two week monitor to assess for any residual/asymptomatic AF. He will return for follow up in 6 months or earlier if needed.  [EKG obtained to assist in diagnosis and management of assessed problem(s)] : EKG obtained to assist in diagnosis and management of assessed problem(s)

## 2023-09-13 ENCOUNTER — RX RENEWAL (OUTPATIENT)
Age: 63
End: 2023-09-13

## 2023-09-18 ENCOUNTER — RX RENEWAL (OUTPATIENT)
Age: 63
End: 2023-09-18

## 2023-09-18 RX ORDER — ALLOPURINOL 100 MG/1
100 TABLET ORAL
Qty: 180 | Refills: 3 | Status: ACTIVE | COMMUNITY
Start: 2019-07-10 | End: 1900-01-01

## 2023-09-27 ENCOUNTER — APPOINTMENT (OUTPATIENT)
Dept: CARDIOLOGY | Facility: CLINIC | Age: 63
End: 2023-09-27
Payer: COMMERCIAL

## 2023-09-27 VITALS
SYSTOLIC BLOOD PRESSURE: 138 MMHG | BODY MASS INDEX: 29.92 KG/M2 | WEIGHT: 202 LBS | DIASTOLIC BLOOD PRESSURE: 76 MMHG | TEMPERATURE: 98.2 F | HEART RATE: 61 BPM | OXYGEN SATURATION: 95 % | HEIGHT: 69 IN

## 2023-09-27 PROCEDURE — 93000 ELECTROCARDIOGRAM COMPLETE: CPT

## 2023-09-27 PROCEDURE — 99215 OFFICE O/P EST HI 40 MIN: CPT | Mod: 25

## 2023-09-27 RX ORDER — FLECAINIDE ACETATE 100 MG/1
100 TABLET ORAL
Qty: 180 | Refills: 1 | Status: DISCONTINUED | COMMUNITY
Start: 2023-02-08 | End: 2023-09-27

## 2023-12-07 ENCOUNTER — NON-APPOINTMENT (OUTPATIENT)
Age: 63
End: 2023-12-07

## 2024-01-10 ENCOUNTER — NON-APPOINTMENT (OUTPATIENT)
Age: 64
End: 2024-01-10

## 2024-01-10 ENCOUNTER — APPOINTMENT (OUTPATIENT)
Dept: ELECTROPHYSIOLOGY | Facility: CLINIC | Age: 64
End: 2024-01-10
Payer: COMMERCIAL

## 2024-01-10 VITALS
HEIGHT: 69 IN | HEART RATE: 57 BPM | BODY MASS INDEX: 30.07 KG/M2 | WEIGHT: 203 LBS | DIASTOLIC BLOOD PRESSURE: 62 MMHG | OXYGEN SATURATION: 96 % | SYSTOLIC BLOOD PRESSURE: 112 MMHG

## 2024-01-10 PROCEDURE — 99214 OFFICE O/P EST MOD 30 MIN: CPT | Mod: 25

## 2024-01-10 PROCEDURE — 93000 ELECTROCARDIOGRAM COMPLETE: CPT

## 2024-01-10 NOTE — DISCUSSION/SUMMARY
[FreeTextEntry1] : In summary, the patient is a 62-year-old male with a history of symptomatic paroxysmal atrial fibrillation and HTN. The patient underwent ablation for atrial fibrillation on 3/13/23. He has done well since the procedure with no symptomatic recurrences. He is on aspirin and metoprolol. Eliquis was stopped 3 months after the ablation procedure (CHADSVASC score 1). He will return for follow up in 6 months or earlier if needed. [EKG obtained to assist in diagnosis and management of assessed problem(s)] : EKG obtained to assist in diagnosis and management of assessed problem(s)

## 2024-01-10 NOTE — HISTORY OF PRESENT ILLNESS
[FreeTextEntry1] : Mr. Kimbrough is a pleasant 62-year-old male here for follow up today. The patient has a history of paroxysmal atrial fibrillation s/p ablation, HTN and gout.  To summarize, the patient was first diagnosed with atrial fibrillation in September of 2022. The patient had been experiencing some shortness of breath and noted his heart rate to be close to 200 bpm on his wife's Apple Watch. He was eventually diagnosed with atrial fibrillation and outpatient monitoring which demonstrated paroxysmal AF with an 11% burden. He was again in the ER earlier this year with atrial fibrillation and RVR. He self-converted to sinus rhythm and was started on flecainide and metoprolol. He was on aspirin (CHADSVASC score 1). He continued to have symptomatic AF while on flecainide. He eventually underwent ablation for atrial fibrillation on 3/13/23 (PVI+CTI). He was started on eliquis after the ablation procedure. Eliquis and flecainide were stopped 3 months after the procedure.   He is here for follow up today. He reports to be doing well and denies any symptoms of palpitations, shortness of breath, dizziness, syncope or bleeding. He is tolerating his medications well. He is on aspirin 81 mg daily and metoprolol 25 mg daily (also used for HTN). Outpatient monitoring 4 months post-ablation did not detect any atrial fibrillation.

## 2024-01-12 ENCOUNTER — RX RENEWAL (OUTPATIENT)
Age: 64
End: 2024-01-12

## 2024-03-08 ENCOUNTER — APPOINTMENT (OUTPATIENT)
Dept: FAMILY MEDICINE | Facility: CLINIC | Age: 64
End: 2024-03-08
Payer: COMMERCIAL

## 2024-03-08 VITALS
BODY MASS INDEX: 30.36 KG/M2 | DIASTOLIC BLOOD PRESSURE: 70 MMHG | HEIGHT: 69 IN | HEART RATE: 60 BPM | WEIGHT: 205 LBS | OXYGEN SATURATION: 98 % | RESPIRATION RATE: 16 BRPM | SYSTOLIC BLOOD PRESSURE: 122 MMHG

## 2024-03-08 DIAGNOSIS — N52.9 MALE ERECTILE DYSFUNCTION, UNSPECIFIED: ICD-10-CM

## 2024-03-08 DIAGNOSIS — I48.0 PAROXYSMAL ATRIAL FIBRILLATION: ICD-10-CM

## 2024-03-08 DIAGNOSIS — R31.29 OTHER MICROSCOPIC HEMATURIA: ICD-10-CM

## 2024-03-08 PROCEDURE — 99213 OFFICE O/P EST LOW 20 MIN: CPT | Mod: 25

## 2024-03-08 PROCEDURE — 81003 URINALYSIS AUTO W/O SCOPE: CPT | Mod: QW

## 2024-03-08 PROCEDURE — 99396 PREV VISIT EST AGE 40-64: CPT

## 2024-03-08 RX ORDER — TADALAFIL 10 MG/1
10 TABLET, FILM COATED ORAL
Qty: 10 | Refills: 2 | Status: ACTIVE | COMMUNITY
Start: 2024-03-08 | End: 1900-01-01

## 2024-03-08 NOTE — HEALTH RISK ASSESSMENT
[0] : 2) Feeling down, depressed, or hopeless: Not at all (0) [PHQ-2 Negative - No further assessment needed] : PHQ-2 Negative - No further assessment needed [FreeTextEntry1] : ed, recent afib, snoring  [[id number]] : 0

## 2024-03-08 NOTE — DATA REVIEWED
[FreeTextEntry1] : pt had ablation w dr guzmán urine dip was  tr blood reviewed recordsof echo and nl la, reviewed last stress and nl, reviewed calcium score and 7

## 2024-03-08 NOTE — PLAN
[FreeTextEntry1] : pt to fiu in 3 mos to see how made out w sleep apnea treatment as referred pt to them for opinion  pt to fu cardio  4/3/24 to [facility] paf pt to get labs fasting - script given pt to do cialis 5mg as needed one hour before actiticy as notes recently decreased ability to getr and maintain erection pt to watch salt , and comply w asa and see pulm for sleep  apnea as possible cause for afib  pt colon is up to date  for blood in urine will do ua though likely false pos given was neg last time as well w same tr  pt and wife comfortable w plan

## 2024-03-08 NOTE — REVIEW OF SYSTEMS
[Fever] : no fever [Earache] : no earache [Chest Pain] : no chest pain [Shortness Of Breath] : no shortness of breath [FreeTextEntry4] : pt wife  notes pt snoreds and stops breathing at times , she tates he never got machine and did a home test only [Abdominal Pain] : no abdominal pain

## 2024-03-08 NOTE — PHYSICAL EXAM
[No Acute Distress] : no acute distress [Normal TMs] : both tympanic membranes were normal [Clear to Auscultation] : lungs were clear to auscultation bilaterally [Supple] : supple [Normal Rate] : normal rate  [Regular Rhythm] : with a regular rhythm [Soft] : abdomen soft [Normal S1, S2] : normal S1 and S2 [No Joint Swelling] : no joint swelling [Non Tender] : non-tender [No CVA Tenderness] : no CVA  tenderness

## 2024-03-08 NOTE — HISTORY OF PRESENT ILLNESS
[FreeTextEntry1] : CPE, had afib ,ablation 3/23 , pt feels better since, pt wife notes he snores  and non usiing cpap, pt wife present for vist w pt. pt notes no complaints of cp, no sob, no abd pain , pt is  [de-identified] : pt notes no cp, no sob, pt note no palps

## 2024-03-12 LAB
APPEARANCE: CLEAR
BACTERIA: NEGATIVE /HPF
BILIRUB UR QL STRIP: NORMAL
BILIRUBIN URINE: NEGATIVE
BLOOD URINE: NEGATIVE
CALCIUM OXALATE CRYSTALS: PRESENT
CLARITY UR: CLEAR
COLOR: NORMAL
GLUCOSE QUALITATIVE U: NEGATIVE
GLUCOSE UR-MCNC: NORMAL
HCG UR QL: 0.2 EU/DL
HGB UR QL STRIP.AUTO: NORMAL
KETONES UR-MCNC: NORMAL
KETONES URINE: NEGATIVE
LEUKOCYTE ESTERASE UR QL STRIP: NORMAL
LEUKOCYTE ESTERASE URINE: NEGATIVE
MICROSCOPIC-UA: NORMAL
NITRITE UR QL STRIP: NORMAL
NITRITE URINE: NEGATIVE
PH UR STRIP: 6
PH URINE: 6.5
PROT UR STRIP-MCNC: NORMAL
PROTEIN URINE: ABNORMAL
RED BLOOD CELLS URINE: 0 /HPF
SP GR UR STRIP: 1.01
SPECIFIC GRAVITY URINE: 1.02
UROBILINOGEN URINE: NORMAL
WHITE BLOOD CELLS URINE: 0 /HPF

## 2024-03-27 ENCOUNTER — RX RENEWAL (OUTPATIENT)
Age: 64
End: 2024-03-27

## 2024-03-27 RX ORDER — ATORVASTATIN CALCIUM 10 MG/1
10 TABLET, FILM COATED ORAL
Qty: 90 | Refills: 1 | Status: ACTIVE | COMMUNITY
Start: 2017-04-22 | End: 1900-01-01

## 2024-04-03 ENCOUNTER — APPOINTMENT (OUTPATIENT)
Dept: CARDIOLOGY | Facility: CLINIC | Age: 64
End: 2024-04-03
Payer: COMMERCIAL

## 2024-04-03 VITALS
HEIGHT: 69 IN | OXYGEN SATURATION: 96 % | HEART RATE: 65 BPM | WEIGHT: 206 LBS | TEMPERATURE: 98.7 F | SYSTOLIC BLOOD PRESSURE: 130 MMHG | BODY MASS INDEX: 30.51 KG/M2 | DIASTOLIC BLOOD PRESSURE: 70 MMHG

## 2024-04-03 DIAGNOSIS — I10 ESSENTIAL (PRIMARY) HYPERTENSION: ICD-10-CM

## 2024-04-03 DIAGNOSIS — Z00.00 ENCOUNTER FOR GENERAL ADULT MEDICAL EXAMINATION W/OUT ABNORMAL FINDINGS: ICD-10-CM

## 2024-04-03 DIAGNOSIS — G47.33 OBSTRUCTIVE SLEEP APNEA (ADULT) (PEDIATRIC): ICD-10-CM

## 2024-04-03 DIAGNOSIS — R06.09 OTHER FORMS OF DYSPNEA: ICD-10-CM

## 2024-04-03 DIAGNOSIS — R94.31 ABNORMAL ELECTROCARDIOGRAM [ECG] [EKG]: ICD-10-CM

## 2024-04-03 PROCEDURE — G2211 COMPLEX E/M VISIT ADD ON: CPT

## 2024-04-03 PROCEDURE — 93000 ELECTROCARDIOGRAM COMPLETE: CPT

## 2024-04-03 PROCEDURE — 99215 OFFICE O/P EST HI 40 MIN: CPT

## 2024-04-03 RX ORDER — LISINOPRIL 20 MG/1
20 TABLET ORAL
Qty: 90 | Refills: 2 | Status: ACTIVE | COMMUNITY
Start: 2021-04-30 | End: 1900-01-01

## 2024-04-03 NOTE — CARDIOLOGY SUMMARY
[de-identified] : 4 3 2024: Sinus Rhythm  -Incomplete right bundle branch block. -Nonspecific T-abnormality. ABNORMAL  9 27 2023 : Sinus Rhythm  -Prominent R(V1) and left axis -nonspecific -Seen with pulmonary disease -possible anterior fascicular block.  ABNORMAL 9 13 2022   Sinus  Rhythm  -Incomplete right bundle branch block.  ABNORMAL   [de-identified] : cot 2022:  CAC = 7.    Calcified plaq in Left main.   no stenosis.  [de-identified] : july 2023: Sinus. no significant events. no afib  [de-identified] : march 2023:  ablation for atrial fibrillation on 3/13/23 (PVI+CTI). [de-identified] : Lid propfile:  mar 2024:  LDL : 79l  HDL : 42;  Totao: 152   TGs 155

## 2024-04-03 NOTE — PHYSICAL EXAM
[Well Developed] : well developed [Well Nourished] : well nourished [No Acute Distress] : no acute distress [Normal Conjunctiva] : normal conjunctiva [No Carotid Bruit] : no carotid bruit [Normal Venous Pressure] : normal venous pressure [No Murmur] : no murmur [Normal S1, S2] : normal S1, S2 [No Gallop] : no gallop [No Rub] : no rub [Clear Lung Fields] : clear lung fields [Good Air Entry] : good air entry [No Respiratory Distress] : no respiratory distress  [Non Tender] : non-tender [Soft] : abdomen soft [No Masses/organomegaly] : no masses/organomegaly [Normal Bowel Sounds] : normal bowel sounds [No Edema] : no edema [Normal Gait] : normal gait [No Cyanosis] : no cyanosis [No Clubbing] : no clubbing [No Skin Lesions] : no skin lesions [No Rash] : no rash [No Varicosities] : no varicosities [Moves all extremities] : moves all extremities [No Focal Deficits] : no focal deficits [Normal Speech] : normal speech [Alert and Oriented] : alert and oriented [Normal memory] : normal memory

## 2024-04-03 NOTE — DISCUSSION/SUMMARY
[Patient] : the patient [Risks] : risks [Benefits] : benefits [Alternatives] : alternatives [With Me] : with me [___ Year(s)] : in [unfilled] year(s) [FreeTextEntry1] : This is a 63 year old male with  history of hypertension and dyslipidemia ,  Gout, with chest pain and headacehs uncontrolled hypertension and afib    1) hypertensive emergency:    ct current meds.  diet and exercise.  transthoracic echocardiogram  2) chest pain : non cardaic . resolved.  3)   minimal CAD  3) atrial fibrillation :  paroxyasmal. CHADSVASC 1.  s/p ablation. aspirin 81 mg.   trial of  coming off the flecanide.  f./u with Dr. Hammond.  ct metoprolo for now.  4) dyslipidemia : ct statins  check liporotien a 5) hypertension : ct crurent medss  lisinopril 20 mg.  transthoracic echocardiogram  [EKG obtained to assist in diagnosis and management of assessed problem(s)] : EKG obtained to assist in diagnosis and management of assessed problem(s)

## 2024-04-03 NOTE — HISTORY OF PRESENT ILLNESS
[FreeTextEntry1] : hypertension and headaches and chest pain .    HPI for today: :   feels good. no chest pain . no dizizness. no dyspnea on exertion .  compiant wiht meds. no syncope   old note: he was told to stop the medication metoprolol and stopped flecanide.  after a week of stopping the meds he feel tired.  and  he restarted flecanide. no dyspnea on exertion . no chest pain . no dizizness. no syncope when he bends like that he feels tight.  bendopenia symp[toms.  no syncope   old note: This is a 61 year old male with  history of hypertension and dyslipidemia ,  Gout, with chest pain and headacehs uncontrolled hypertension and afib  over the weekend he was having headachs. and he checked his BP. and it was high >200. and his wife checked his heart rate and it was atrial fibrillation .  on iwatch.  at that time he was eating a lot of cheese that day.  he recently did increase his BP meds doubled up lisinopril. 3 mths ago. he did not go to ER during day time.  he did take extra lisinopril that night.  and at night he went to ER. he was there for 1 hr.  in ER the ECg was ok.  after an hour, he left because the Er was packed.  he did not do the blood work in er.  all this happened on saturday . few days ago.  he is feeling good now.  he does have chest pain .  chest pressure. .  Onset:   Type: Pressure Duration: intermittne  days. intensity:  4/10, lasted for :  few mins;   Radiation:  NONE   Associated symptoms: none  Dyspnea. . no dyspnea on exertion .   No smoking. no lacohol. no drugs Family history  Mother:   + cerebrovascular accident .  hypertension  father:  heart issues.

## 2024-04-12 ENCOUNTER — APPOINTMENT (OUTPATIENT)
Dept: CARDIOLOGY | Facility: CLINIC | Age: 64
End: 2024-04-12
Payer: COMMERCIAL

## 2024-04-12 PROCEDURE — 93306 TTE W/DOPPLER COMPLETE: CPT

## 2024-04-26 ENCOUNTER — TRANSCRIPTION ENCOUNTER (OUTPATIENT)
Age: 64
End: 2024-04-26

## 2024-04-26 LAB
APO B SERPL-MCNC: 81 MG/DL
APO LP(A) SERPL-MCNC: 33.8 NMOL/L

## 2024-06-14 ENCOUNTER — APPOINTMENT (OUTPATIENT)
Dept: FAMILY MEDICINE | Facility: CLINIC | Age: 64
End: 2024-06-14
Payer: COMMERCIAL

## 2024-06-14 VITALS
SYSTOLIC BLOOD PRESSURE: 118 MMHG | BODY MASS INDEX: 30.51 KG/M2 | WEIGHT: 206 LBS | HEART RATE: 56 BPM | DIASTOLIC BLOOD PRESSURE: 70 MMHG | TEMPERATURE: 98 F | RESPIRATION RATE: 16 BRPM | HEIGHT: 69 IN | OXYGEN SATURATION: 97 %

## 2024-06-14 DIAGNOSIS — E78.5 HYPERLIPIDEMIA, UNSPECIFIED: ICD-10-CM

## 2024-06-14 DIAGNOSIS — K21.9 GASTRO-ESOPHAGEAL REFLUX DISEASE W/OUT ESOPHAGITIS: ICD-10-CM

## 2024-06-14 DIAGNOSIS — R73.9 HYPERGLYCEMIA, UNSPECIFIED: ICD-10-CM

## 2024-06-14 DIAGNOSIS — L03.019 CELLULITIS OF UNSPECIFIED FINGER: ICD-10-CM

## 2024-06-14 DIAGNOSIS — I10 ESSENTIAL (PRIMARY) HYPERTENSION: ICD-10-CM

## 2024-06-14 DIAGNOSIS — R05.9 COUGH, UNSPECIFIED: ICD-10-CM

## 2024-06-14 PROCEDURE — 99214 OFFICE O/P EST MOD 30 MIN: CPT

## 2024-06-14 PROCEDURE — G2211 COMPLEX E/M VISIT ADD ON: CPT

## 2024-06-14 RX ORDER — OMEPRAZOLE 40 MG/1
40 CAPSULE, DELAYED RELEASE ORAL
Qty: 30 | Refills: 2 | Status: ACTIVE | COMMUNITY
Start: 2024-06-14 | End: 1900-01-01

## 2024-06-14 RX ORDER — CEPHALEXIN 500 MG/1
500 CAPSULE ORAL 3 TIMES DAILY
Qty: 21 | Refills: 0 | Status: ACTIVE | COMMUNITY
Start: 2024-06-14 | End: 1900-01-01

## 2024-06-14 NOTE — HISTORY OF PRESENT ILLNESS
[FreeTextEntry1] : 3 month F/U, pt saw cardio and he is off flecamnide but on statin and bblocker  [de-identified] : pt complains of  finger irritated on lr middle finger, pt notes some fluid come out, pt works w glass perhaps fb, pt notes feels otherwise wel pt labs were good recent,

## 2024-06-14 NOTE — PLAN
[FreeTextEntry1] : Patient notes cough if lay flat bending forward was or squeeze abdomen, patient's weight is relatively the same but perhaps he is having some reflux in an effort to offset the symptoms will do a trial of antacid to see if alleviates symptoms pt notes coughn for 1.5 weeks, pt to do as above pt to cont meds for paronychia will do antibiotic, and fu in one month pt c;ear instruction  For patient's sugar slightly elevated at 6.1 watch the carbs and we will repeat in July to see how doing

## 2024-06-25 ENCOUNTER — RX RENEWAL (OUTPATIENT)
Age: 64
End: 2024-06-25

## 2024-06-25 RX ORDER — METOPROLOL TARTRATE 25 MG/1
25 TABLET, FILM COATED ORAL TWICE DAILY
Qty: 60 | Refills: 0 | Status: ACTIVE | COMMUNITY
Start: 2023-02-15 | End: 1900-01-01

## 2024-07-17 ENCOUNTER — APPOINTMENT (OUTPATIENT)
Dept: ELECTROPHYSIOLOGY | Facility: CLINIC | Age: 64
End: 2024-07-17
Payer: COMMERCIAL

## 2024-07-17 ENCOUNTER — NON-APPOINTMENT (OUTPATIENT)
Age: 64
End: 2024-07-17

## 2024-07-17 VITALS
BODY MASS INDEX: 29.77 KG/M2 | HEIGHT: 69 IN | SYSTOLIC BLOOD PRESSURE: 120 MMHG | HEART RATE: 64 BPM | DIASTOLIC BLOOD PRESSURE: 72 MMHG | WEIGHT: 201 LBS | OXYGEN SATURATION: 98 %

## 2024-07-17 DIAGNOSIS — Z86.79 OTHER SPECIFIED POSTPROCEDURAL STATES: ICD-10-CM

## 2024-07-17 DIAGNOSIS — Z98.890 OTHER SPECIFIED POSTPROCEDURAL STATES: ICD-10-CM

## 2024-07-17 DIAGNOSIS — I48.0 PAROXYSMAL ATRIAL FIBRILLATION: ICD-10-CM

## 2024-07-17 PROCEDURE — 99214 OFFICE O/P EST MOD 30 MIN: CPT | Mod: 25

## 2024-07-17 PROCEDURE — 93000 ELECTROCARDIOGRAM COMPLETE: CPT

## 2024-07-31 ENCOUNTER — APPOINTMENT (OUTPATIENT)
Dept: FAMILY MEDICINE | Facility: CLINIC | Age: 64
End: 2024-07-31

## 2024-08-09 ENCOUNTER — APPOINTMENT (OUTPATIENT)
Dept: FAMILY MEDICINE | Facility: CLINIC | Age: 64
End: 2024-08-09

## 2024-08-15 ENCOUNTER — RX RENEWAL (OUTPATIENT)
Age: 64
End: 2024-08-15

## 2024-09-03 ENCOUNTER — RX RENEWAL (OUTPATIENT)
Age: 64
End: 2024-09-03

## 2024-09-18 ENCOUNTER — RX RENEWAL (OUTPATIENT)
Age: 64
End: 2024-09-18

## 2024-10-21 ENCOUNTER — APPOINTMENT (OUTPATIENT)
Dept: INTERNAL MEDICINE | Facility: CLINIC | Age: 64
End: 2024-10-21

## 2024-10-25 ENCOUNTER — APPOINTMENT (OUTPATIENT)
Dept: INTERNAL MEDICINE | Facility: CLINIC | Age: 64
End: 2024-10-25

## 2024-11-15 ENCOUNTER — APPOINTMENT (OUTPATIENT)
Dept: INTERNAL MEDICINE | Facility: CLINIC | Age: 64
End: 2024-11-15
Payer: COMMERCIAL

## 2024-11-15 VITALS — HEIGHT: 69 IN | WEIGHT: 202 LBS | BODY MASS INDEX: 29.92 KG/M2

## 2024-11-15 VITALS — RESPIRATION RATE: 12 BRPM | HEART RATE: 72 BPM | DIASTOLIC BLOOD PRESSURE: 77 MMHG | SYSTOLIC BLOOD PRESSURE: 116 MMHG

## 2024-11-15 DIAGNOSIS — Z00.00 ENCOUNTER FOR GENERAL ADULT MEDICAL EXAMINATION W/OUT ABNORMAL FINDINGS: ICD-10-CM

## 2024-11-15 DIAGNOSIS — I10 ESSENTIAL (PRIMARY) HYPERTENSION: ICD-10-CM

## 2024-11-15 DIAGNOSIS — E78.5 HYPERLIPIDEMIA, UNSPECIFIED: ICD-10-CM

## 2024-11-15 DIAGNOSIS — I48.0 PAROXYSMAL ATRIAL FIBRILLATION: ICD-10-CM

## 2024-11-15 PROCEDURE — 36415 COLL VENOUS BLD VENIPUNCTURE: CPT

## 2024-11-15 PROCEDURE — 99386 PREV VISIT NEW AGE 40-64: CPT

## 2024-11-16 LAB
ALBUMIN SERPL ELPH-MCNC: 4.7 G/DL
ALP BLD-CCNC: 104 U/L
ALT SERPL-CCNC: 36 U/L
ANION GAP SERPL CALC-SCNC: 13 MMOL/L
APPEARANCE: CLEAR
AST SERPL-CCNC: 30 U/L
BACTERIA: NEGATIVE /HPF
BASOPHILS # BLD AUTO: 0.07 K/UL
BASOPHILS NFR BLD AUTO: 1.2 %
BILIRUB SERPL-MCNC: 0.4 MG/DL
BILIRUBIN URINE: NEGATIVE
BLOOD URINE: NEGATIVE
BUN SERPL-MCNC: 21 MG/DL
CALCIUM SERPL-MCNC: 9.7 MG/DL
CAST: 0 /LPF
CHLORIDE SERPL-SCNC: 105 MMOL/L
CHOLEST SERPL-MCNC: 197 MG/DL
CO2 SERPL-SCNC: 24 MMOL/L
COLOR: YELLOW
CREAT SERPL-MCNC: 1.48 MG/DL
CREAT SPEC-SCNC: 126 MG/DL
EGFR: 53 ML/MIN/1.73M2
EOSINOPHIL # BLD AUTO: 0.1 K/UL
EOSINOPHIL NFR BLD AUTO: 1.7 %
EPITHELIAL CELLS: 0 /HPF
ESTIMATED AVERAGE GLUCOSE: 126 MG/DL
GLUCOSE QUALITATIVE U: NEGATIVE MG/DL
GLUCOSE SERPL-MCNC: 95 MG/DL
HBA1C MFR BLD HPLC: 6 %
HCT VFR BLD CALC: 44.3 %
HDLC SERPL-MCNC: 43 MG/DL
HGB BLD-MCNC: 14.3 G/DL
IMM GRANULOCYTES NFR BLD AUTO: 0.2 %
KETONES URINE: NEGATIVE MG/DL
LDLC SERPL CALC-MCNC: 103 MG/DL
LEUKOCYTE ESTERASE URINE: ABNORMAL
LYMPHOCYTES # BLD AUTO: 2 K/UL
LYMPHOCYTES NFR BLD AUTO: 33.8 %
MAN DIFF?: NORMAL
MCHC RBC-ENTMCNC: 30.6 PG
MCHC RBC-ENTMCNC: 32.3 G/DL
MCV RBC AUTO: 94.9 FL
MICROALBUMIN 24H UR DL<=1MG/L-MCNC: 1.2 MG/DL
MICROALBUMIN/CREAT 24H UR-RTO: 10 MG/G
MICROSCOPIC-UA: NORMAL
MONOCYTES # BLD AUTO: 0.44 K/UL
MONOCYTES NFR BLD AUTO: 7.4 %
NEUTROPHILS # BLD AUTO: 3.3 K/UL
NEUTROPHILS NFR BLD AUTO: 55.7 %
NITRITE URINE: NEGATIVE
NONHDLC SERPL-MCNC: 154 MG/DL
PH URINE: 5.5
PLATELET # BLD AUTO: 243 K/UL
POTASSIUM SERPL-SCNC: 5 MMOL/L
PROT SERPL-MCNC: 7.8 G/DL
PROTEIN URINE: NEGATIVE MG/DL
PSA SERPL-MCNC: 1.71 NG/ML
RBC # BLD: 4.67 M/UL
RBC # FLD: 13.7 %
RED BLOOD CELLS URINE: 0 /HPF
SODIUM SERPL-SCNC: 142 MMOL/L
SPECIFIC GRAVITY URINE: 1.02
T4 FREE SERPL-MCNC: 1.4 NG/DL
TRIGL SERPL-MCNC: 301 MG/DL
TSH SERPL-ACNC: 1.64 UIU/ML
UROBILINOGEN URINE: 0.2 MG/DL
WBC # FLD AUTO: 5.92 K/UL
WHITE BLOOD CELLS URINE: 3 /HPF

## 2025-01-15 ENCOUNTER — APPOINTMENT (OUTPATIENT)
Dept: ELECTROPHYSIOLOGY | Facility: CLINIC | Age: 65
End: 2025-01-15
Payer: COMMERCIAL

## 2025-01-15 VITALS
DIASTOLIC BLOOD PRESSURE: 75 MMHG | OXYGEN SATURATION: 99 % | SYSTOLIC BLOOD PRESSURE: 138 MMHG | BODY MASS INDEX: 29.92 KG/M2 | WEIGHT: 202 LBS | HEIGHT: 69 IN | HEART RATE: 68 BPM

## 2025-01-15 DIAGNOSIS — I48.0 PAROXYSMAL ATRIAL FIBRILLATION: ICD-10-CM

## 2025-01-15 PROCEDURE — 99214 OFFICE O/P EST MOD 30 MIN: CPT | Mod: 25

## 2025-01-15 PROCEDURE — 93000 ELECTROCARDIOGRAM COMPLETE: CPT

## 2025-01-23 ENCOUNTER — NON-APPOINTMENT (OUTPATIENT)
Age: 65
End: 2025-01-23

## 2025-01-23 ENCOUNTER — APPOINTMENT (OUTPATIENT)
Dept: CARDIOLOGY | Facility: CLINIC | Age: 65
End: 2025-01-23
Payer: COMMERCIAL

## 2025-01-23 VITALS
SYSTOLIC BLOOD PRESSURE: 130 MMHG | WEIGHT: 200 LBS | BODY MASS INDEX: 29.62 KG/M2 | HEIGHT: 69 IN | DIASTOLIC BLOOD PRESSURE: 60 MMHG | OXYGEN SATURATION: 95 % | HEART RATE: 70 BPM

## 2025-01-23 DIAGNOSIS — G47.33 OBSTRUCTIVE SLEEP APNEA (ADULT) (PEDIATRIC): ICD-10-CM

## 2025-01-23 DIAGNOSIS — R06.09 OTHER FORMS OF DYSPNEA: ICD-10-CM

## 2025-01-23 DIAGNOSIS — K21.9 GASTRO-ESOPHAGEAL REFLUX DISEASE W/OUT ESOPHAGITIS: ICD-10-CM

## 2025-01-23 DIAGNOSIS — R94.31 ABNORMAL ELECTROCARDIOGRAM [ECG] [EKG]: ICD-10-CM

## 2025-01-23 DIAGNOSIS — E78.5 HYPERLIPIDEMIA, UNSPECIFIED: ICD-10-CM

## 2025-01-23 DIAGNOSIS — K29.70 GASTRITIS, UNSPECIFIED, W/OUT BLEEDING: ICD-10-CM

## 2025-01-23 DIAGNOSIS — I10 ESSENTIAL (PRIMARY) HYPERTENSION: ICD-10-CM

## 2025-01-23 DIAGNOSIS — Z00.00 ENCOUNTER FOR GENERAL ADULT MEDICAL EXAMINATION W/OUT ABNORMAL FINDINGS: ICD-10-CM

## 2025-01-23 PROCEDURE — 99215 OFFICE O/P EST HI 40 MIN: CPT

## 2025-01-23 PROCEDURE — G2211 COMPLEX E/M VISIT ADD ON: CPT | Mod: NC

## 2025-01-23 PROCEDURE — 93000 ELECTROCARDIOGRAM COMPLETE: CPT

## 2025-01-23 RX ORDER — LISINOPRIL AND HYDROCHLOROTHIAZIDE TABLETS 20; 25 MG/1; MG/1
20-25 TABLET ORAL DAILY
Qty: 90 | Refills: 3 | Status: ACTIVE | COMMUNITY
Start: 2025-01-23 | End: 1900-01-01

## 2025-01-25 LAB — H PYLORI AG STL QL: NEGATIVE

## 2025-03-14 ENCOUNTER — APPOINTMENT (OUTPATIENT)
Dept: FAMILY MEDICINE | Facility: CLINIC | Age: 65
End: 2025-03-14

## 2025-04-02 ENCOUNTER — APPOINTMENT (OUTPATIENT)
Dept: CARDIOLOGY | Facility: CLINIC | Age: 65
End: 2025-04-02

## 2025-05-06 ENCOUNTER — APPOINTMENT (OUTPATIENT)
Dept: CARDIOLOGY | Facility: CLINIC | Age: 65
End: 2025-05-06
Payer: COMMERCIAL

## 2025-05-06 ENCOUNTER — NON-APPOINTMENT (OUTPATIENT)
Age: 65
End: 2025-05-06

## 2025-05-06 VITALS
SYSTOLIC BLOOD PRESSURE: 112 MMHG | DIASTOLIC BLOOD PRESSURE: 58 MMHG | HEART RATE: 67 BPM | WEIGHT: 199 LBS | HEIGHT: 69 IN | OXYGEN SATURATION: 97 % | BODY MASS INDEX: 29.47 KG/M2

## 2025-05-06 DIAGNOSIS — I10 ESSENTIAL (PRIMARY) HYPERTENSION: ICD-10-CM

## 2025-05-06 DIAGNOSIS — Z86.79 OTHER SPECIFIED POSTPROCEDURAL STATES: ICD-10-CM

## 2025-05-06 DIAGNOSIS — Z98.890 OTHER SPECIFIED POSTPROCEDURAL STATES: ICD-10-CM

## 2025-05-06 DIAGNOSIS — I48.0 PAROXYSMAL ATRIAL FIBRILLATION: ICD-10-CM

## 2025-05-06 PROCEDURE — 99214 OFFICE O/P EST MOD 30 MIN: CPT | Mod: 25

## 2025-05-06 PROCEDURE — 93000 ELECTROCARDIOGRAM COMPLETE: CPT

## 2025-05-16 LAB
ANION GAP SERPL CALC-SCNC: 15 MMOL/L
BUN SERPL-MCNC: 29 MG/DL
CALCIUM SERPL-MCNC: 9.5 MG/DL
CHLORIDE SERPL-SCNC: 107 MMOL/L
CO2 SERPL-SCNC: 20 MMOL/L
CREAT SERPL-MCNC: 1.65 MG/DL
EGFRCR SERPLBLD CKD-EPI 2021: 46 ML/MIN/1.73M2
GLUCOSE SERPL-MCNC: 103 MG/DL
POTASSIUM SERPL-SCNC: 4.6 MMOL/L
SODIUM SERPL-SCNC: 142 MMOL/L

## 2025-05-22 ENCOUNTER — NON-APPOINTMENT (OUTPATIENT)
Age: 65
End: 2025-05-22

## 2025-05-28 ENCOUNTER — NON-APPOINTMENT (OUTPATIENT)
Age: 65
End: 2025-05-28

## 2025-07-07 ENCOUNTER — APPOINTMENT (OUTPATIENT)
Dept: VASCULAR SURGERY | Facility: CLINIC | Age: 65
End: 2025-07-07
Payer: COMMERCIAL

## 2025-07-07 ENCOUNTER — APPOINTMENT (OUTPATIENT)
Dept: VASCULAR SURGERY | Facility: CLINIC | Age: 65
End: 2025-07-07

## 2025-07-07 VITALS
RESPIRATION RATE: 16 BRPM | WEIGHT: 195 LBS | HEART RATE: 59 BPM | BODY MASS INDEX: 29.21 KG/M2 | TEMPERATURE: 97.7 F | DIASTOLIC BLOOD PRESSURE: 56 MMHG | SYSTOLIC BLOOD PRESSURE: 116 MMHG | OXYGEN SATURATION: 96 % | HEIGHT: 68.5 IN

## 2025-07-07 PROBLEM — R20.2 PARESTHESIA: Status: ACTIVE | Noted: 2025-07-07

## 2025-07-07 PROCEDURE — 99203 OFFICE O/P NEW LOW 30 MIN: CPT

## 2025-07-07 PROCEDURE — 93970 EXTREMITY STUDY: CPT

## 2025-08-18 ENCOUNTER — APPOINTMENT (OUTPATIENT)
Dept: DERMATOLOGY | Facility: CLINIC | Age: 65
End: 2025-08-18
Payer: COMMERCIAL

## 2025-08-18 PROCEDURE — 99203 OFFICE O/P NEW LOW 30 MIN: CPT

## 2025-09-17 ENCOUNTER — NON-APPOINTMENT (OUTPATIENT)
Age: 65
End: 2025-09-17